# Patient Record
Sex: FEMALE | Race: WHITE | NOT HISPANIC OR LATINO | Employment: UNEMPLOYED | URBAN - METROPOLITAN AREA
[De-identification: names, ages, dates, MRNs, and addresses within clinical notes are randomized per-mention and may not be internally consistent; named-entity substitution may affect disease eponyms.]

---

## 2024-07-12 ENCOUNTER — PREP FOR PROCEDURE (OUTPATIENT)
Dept: OBSTETRICS AND GYNECOLOGY | Facility: HOSPITAL | Age: 39
End: 2024-07-12

## 2024-07-12 ENCOUNTER — LAB (OUTPATIENT)
Dept: LAB | Facility: LAB | Age: 39
End: 2024-07-12
Payer: COMMERCIAL

## 2024-07-12 ENCOUNTER — OFFICE VISIT (OUTPATIENT)
Dept: OBSTETRICS AND GYNECOLOGY | Facility: CLINIC | Age: 39
End: 2024-07-12
Payer: COMMERCIAL

## 2024-07-12 VITALS
HEIGHT: 70 IN | SYSTOLIC BLOOD PRESSURE: 112 MMHG | BODY MASS INDEX: 40.8 KG/M2 | DIASTOLIC BLOOD PRESSURE: 72 MMHG | WEIGHT: 285 LBS

## 2024-07-12 DIAGNOSIS — N93.9 ABNORMAL UTERINE BLEEDING (AUB): ICD-10-CM

## 2024-07-12 DIAGNOSIS — N93.9 ABNORMAL UTERINE BLEEDING (AUB): Primary | ICD-10-CM

## 2024-07-12 DIAGNOSIS — Z12.4 CERVICAL CANCER SCREENING: ICD-10-CM

## 2024-07-12 PROBLEM — L68.0 HIRSUTISM: Status: RESOLVED | Noted: 2024-07-12 | Resolved: 2024-07-12

## 2024-07-12 PROBLEM — N63.0 MASS OF AXILLARY TAIL OF BREAST: Status: RESOLVED | Noted: 2024-07-12 | Resolved: 2024-07-12

## 2024-07-12 PROBLEM — H50.812 DUANE'S SYNDROME OF LEFT EYE: Status: ACTIVE | Noted: 2018-07-12

## 2024-07-12 PROBLEM — N94.6 DYSMENORRHEA: Status: ACTIVE | Noted: 2024-07-12

## 2024-07-12 LAB
ERYTHROCYTE [DISTWIDTH] IN BLOOD BY AUTOMATED COUNT: 12.3 % (ref 11.5–14.5)
HCT VFR BLD AUTO: 40.6 % (ref 36–46)
HGB BLD-MCNC: 14.3 G/DL (ref 12–16)
MCH RBC QN AUTO: 31.4 PG (ref 26–34)
MCHC RBC AUTO-ENTMCNC: 35.2 G/DL (ref 32–36)
MCV RBC AUTO: 89 FL (ref 80–100)
NRBC BLD-RTO: 0 /100 WBCS (ref 0–0)
PLATELET # BLD AUTO: 270 X10*3/UL (ref 150–450)
RBC # BLD AUTO: 4.55 X10*6/UL (ref 4–5.2)
TSH SERPL-ACNC: 0.54 MIU/L (ref 0.44–3.98)
WBC # BLD AUTO: 7.9 X10*3/UL (ref 4.4–11.3)

## 2024-07-12 PROCEDURE — 1036F TOBACCO NON-USER: CPT | Performed by: OBSTETRICS & GYNECOLOGY

## 2024-07-12 PROCEDURE — 85027 COMPLETE CBC AUTOMATED: CPT

## 2024-07-12 PROCEDURE — 84443 ASSAY THYROID STIM HORMONE: CPT

## 2024-07-12 PROCEDURE — 36415 COLL VENOUS BLD VENIPUNCTURE: CPT

## 2024-07-12 PROCEDURE — 99214 OFFICE O/P EST MOD 30 MIN: CPT | Performed by: OBSTETRICS & GYNECOLOGY

## 2024-07-12 PROCEDURE — 87624 HPV HI-RISK TYP POOLED RSLT: CPT

## 2024-07-12 RX ORDER — GABAPENTIN 600 MG/1
600 TABLET ORAL ONCE
OUTPATIENT
Start: 2024-07-12 | End: 2024-07-12

## 2024-07-12 RX ORDER — CELECOXIB 50 MG/1
400 CAPSULE ORAL ONCE
OUTPATIENT
Start: 2024-07-12 | End: 2024-07-12

## 2024-07-12 RX ORDER — ACETAMINOPHEN 325 MG/1
975 TABLET ORAL ONCE
OUTPATIENT
Start: 2024-07-12 | End: 2024-07-12

## 2024-07-12 ASSESSMENT — ENCOUNTER SYMPTOMS
PSYCHIATRIC NEGATIVE: 1
CONSTITUTIONAL NEGATIVE: 1
CARDIOVASCULAR NEGATIVE: 1
GASTROINTESTINAL NEGATIVE: 1
MUSCULOSKELETAL NEGATIVE: 1
RESPIRATORY NEGATIVE: 1
NEUROLOGICAL NEGATIVE: 1

## 2024-07-12 NOTE — PATIENT INSTRUCTIONS
Gynecologic Visit for Abnormal Uterine Bleeding:  You were seen in the office today for abnormal uterine bleeding  Abnormal uterine bleeding can include menstrual bleeding that is excessively heavy, painful, prolonged, bleeding between periods, bleeding after/during intercourse, or a combination of these  Causes can include hormonal changes affecting ovulation, unstable and/or thickened endometrial lining, structural abnormalities of the uterus like polyps or fibroids, adenomyosis or endometriosis, and cervical/vaginal abnormalities  You have been given orders for labs and ultrasound imaging. Please do these as soon as possible as the results will help guide necessity of further workup, such as endometrial sampling, and management options. You will receive results by phone/MyChart  Treatment options may include expectant/symptomatic treatment, hormonal suppression and support of the endometrium, surgical thinning of the endometrium with D&C and/or removal of endometrial abnormalities, uterine artery embolization, and hysterectomy  If you have any questions or concerns prior to discussion of lab and imaging results, please call the office. [phone] ([place]) or [phone] ([place])

## 2024-07-12 NOTE — PROGRESS NOTES
"Subjective   Patient ID: Geeta Ramos is a 38 y.o. female who presents for Vaginal Bleeding.  Vaginal Bleeding        Patient presents for AUB. She has a longstanding history of heavy and painful periods. She is s/p ablation in 2020. She was never amenorrheic following her ablation but initially had improvement in symptoms. Over the last year her periods have become heavier and more painful. She states most recently she had a prolonged period that lasted 2 weeks. Prior to that she has had irregular periods between 14-28 days, often with prolonged and heavy bleeding. Over last year she has additionally had onset and worsening of dyspareunia.     Review of Systems   Constitutional: Negative.    Respiratory: Negative.     Cardiovascular: Negative.    Gastrointestinal: Negative.    Genitourinary:  Positive for vaginal bleeding.   Musculoskeletal: Negative.    Skin: Negative.    Neurological: Negative.    Psychiatric/Behavioral: Negative.         Objective   Visit Vitals  /72   Ht 1.778 m (5' 10\")   Wt 129 kg (285 lb)   BMI 40.89 kg/m²   OB Status Ablation   Smoking Status Former   BSA 2.52 m²       Physical Exam  Constitutional:       Appearance: Normal appearance.   Pulmonary:      Effort: Pulmonary effort is normal.   Genitourinary:     Comments: Vulva normal in appearance without discoloration, rashes, lesions. Vagina is negative for blood, discharge, lesions. Uterus is small, mobile, non tender. There is no cervical motion tenderness, adnexal masses/tenderness.    Neurological:      Mental Status: She is alert.   Psychiatric:         Mood and Affect: Mood normal.         Behavior: Behavior normal.         Assessment/Plan   Problem List Items Addressed This Visit    None  Visit Diagnoses         Codes    Abnormal uterine bleeding (AUB)    -  Primary N93.9    Relevant Orders    TSH with reflex to Free T4 if abnormal    CBC    US PELVIS TRANSABDOMINAL WITH TRANSVAGINAL          Discussed findings on exam and " clinical course  Previously evaluated but no recent labs/imaging  Orders given for TSH, CBC, and US  Endometrial sampling is appropriate given worsening AUB with history ablation and risk factors (PCOS)  Discussed management options including progesterone suppression, UAE, and hysterectomy  Will call with all results, patient to schedule apt for EMB  Precautions given       Marilyn Reese,  07/12/24 3:28 PM

## 2024-07-15 PROBLEM — N93.9 ABNORMAL UTERINE BLEEDING (AUB): Status: ACTIVE | Noted: 2024-07-12

## 2024-07-16 ENCOUNTER — HOSPITAL ENCOUNTER (OUTPATIENT)
Dept: RADIOLOGY | Facility: CLINIC | Age: 39
Discharge: HOME | End: 2024-07-16
Payer: COMMERCIAL

## 2024-07-16 DIAGNOSIS — N93.9 ABNORMAL UTERINE BLEEDING (AUB): ICD-10-CM

## 2024-07-16 PROCEDURE — 76830 TRANSVAGINAL US NON-OB: CPT | Performed by: RADIOLOGY

## 2024-07-16 PROCEDURE — 76856 US EXAM PELVIC COMPLETE: CPT | Performed by: RADIOLOGY

## 2024-07-16 PROCEDURE — 76856 US EXAM PELVIC COMPLETE: CPT

## 2024-07-19 LAB
CYTOLOGY CMNT CVX/VAG CYTO-IMP: NORMAL
HPV HR 12 DNA GENITAL QL NAA+PROBE: NEGATIVE
HPV HR GENOTYPES PNL CVX NAA+PROBE: NEGATIVE
HPV16 DNA SPEC QL NAA+PROBE: NEGATIVE
HPV18 DNA SPEC QL NAA+PROBE: NEGATIVE
LAB AP CONTRACEPTIVE HISTORY: NORMAL
LAB AP HPV GENOTYPE QUESTION: YES
LAB AP HPV HR: NORMAL
LABORATORY COMMENT REPORT: NORMAL
PATH REPORT.TOTAL CANCER: NORMAL

## 2024-07-22 ENCOUNTER — APPOINTMENT (OUTPATIENT)
Dept: OBSTETRICS AND GYNECOLOGY | Facility: CLINIC | Age: 39
End: 2024-07-22
Payer: COMMERCIAL

## 2024-07-22 VITALS — SYSTOLIC BLOOD PRESSURE: 120 MMHG | BODY MASS INDEX: 40.89 KG/M2 | WEIGHT: 285 LBS | DIASTOLIC BLOOD PRESSURE: 80 MMHG

## 2024-07-22 DIAGNOSIS — Z32.02 PREGNANCY TEST NEGATIVE: ICD-10-CM

## 2024-07-22 DIAGNOSIS — N93.9 ABNORMAL UTERINE BLEEDING (AUB): Primary | ICD-10-CM

## 2024-07-22 LAB — PREGNANCY TEST URINE, POC: NEGATIVE

## 2024-07-22 PROCEDURE — 81025 URINE PREGNANCY TEST: CPT | Performed by: OBSTETRICS & GYNECOLOGY

## 2024-07-22 PROCEDURE — 88305 TISSUE EXAM BY PATHOLOGIST: CPT

## 2024-07-22 PROCEDURE — 88305 TISSUE EXAM BY PATHOLOGIST: CPT | Performed by: PATHOLOGY

## 2024-07-22 PROCEDURE — 58100 BIOPSY OF UTERUS LINING: CPT | Performed by: OBSTETRICS & GYNECOLOGY

## 2024-07-22 NOTE — PATIENT INSTRUCTIONS
Endometrial Biopsy Instructions:  You were seen in the office today for abnormal uterine bleeding and had an endometrial biopsy done  A nerve block was done at the time of the biopsy to help with pain management but you may have some cramping today. You may take Tylenol or Ibuprofen for any cramping/discomfort  You will likely have some bleeding today that should be light and taper off/stop within a few days  You will receive a call/SocialStayhart message when your results are back  Please call the office for any excessively heavy bleeding (soaking through more than one pad per hour), pain that is not controlled on Ibuprofen/Tylenol, foul smelling vaginal discharge, vaginal itching, vulvar rashes, vaginal odor, pain with urination. [phone] ([place]) or [phone] ([place])

## 2024-07-22 NOTE — PROGRESS NOTES
Patient ID: Geeta Ramos is a 38 y.o. female.    Biopsy endometrium    Date/Time: 7/22/2024 2:45 PM    Performed by: Marilyn Reese DO  Authorized by: Marilyn Reese DO    Consent:     Consent obtained: written    Consent given by: patient    Risks discussed: bleeding, infection and need for repeat procedure    Alternatives discussed: observation, referral and alternative treatment    Patient agrees, verbalizes understanding, and wants to proceed: yes      Procedure explained and questions answered to patient or proxy's satisfaction: yes    Indications:     Indications: abnormal uterine bleeding    Pre-procedure:     Urine pregnancy test: negative      Premeds: NSAID    Procedure:     A bimanual exam was performed: no      Prepped with: Betadine    Tenaculum used: yes      A local block was performed: yes      Block method: paracervical block      Local anesthetic: lidocaine 1% w/o epi    Amount used (mL): 3    Cervix dilated: no      Number of passes: 2  Findings:     Cervix: normal      Uterus depth by sound (cm): 11    Specimen collected: specimen collected and sent to pathology      Patient tolerance: tolerated well, no immediate complications  Marilyn Reese DO

## 2024-07-29 LAB
LABORATORY COMMENT REPORT: NORMAL
PATH REPORT.FINAL DX SPEC: NORMAL
PATH REPORT.GROSS SPEC: NORMAL
PATH REPORT.RELEVANT HX SPEC: NORMAL
PATH REPORT.TOTAL CANCER: NORMAL

## 2024-09-05 ENCOUNTER — PRE-ADMISSION TESTING (OUTPATIENT)
Dept: PREADMISSION TESTING | Facility: HOSPITAL | Age: 39
End: 2024-09-05
Payer: COMMERCIAL

## 2024-09-05 VITALS
OXYGEN SATURATION: 99 % | TEMPERATURE: 97.2 F | RESPIRATION RATE: 18 BRPM | BODY MASS INDEX: 41.03 KG/M2 | DIASTOLIC BLOOD PRESSURE: 81 MMHG | WEIGHT: 286.6 LBS | SYSTOLIC BLOOD PRESSURE: 137 MMHG | HEIGHT: 70 IN | HEART RATE: 76 BPM

## 2024-09-05 DIAGNOSIS — N93.9 ABNORMAL UTERINE BLEEDING (AUB): ICD-10-CM

## 2024-09-05 DIAGNOSIS — Z01.818 PREOPERATIVE EXAMINATION: Primary | ICD-10-CM

## 2024-09-05 LAB
ABO GROUP (TYPE) IN BLOOD: NORMAL
ANTIBODY SCREEN: NORMAL
APPEARANCE UR: CLEAR
BILIRUB UR STRIP.AUTO-MCNC: NEGATIVE MG/DL
COLOR UR: COLORLESS
ERYTHROCYTE [DISTWIDTH] IN BLOOD BY AUTOMATED COUNT: 11.9 % (ref 11.5–14.5)
GLUCOSE UR STRIP.AUTO-MCNC: NORMAL MG/DL
HCT VFR BLD AUTO: 42.1 % (ref 36–46)
HGB BLD-MCNC: 14.4 G/DL (ref 12–16)
HOLD SPECIMEN: NORMAL
KETONES UR STRIP.AUTO-MCNC: NEGATIVE MG/DL
LEUKOCYTE ESTERASE UR QL STRIP.AUTO: NEGATIVE
MCH RBC QN AUTO: 30.2 PG (ref 26–34)
MCHC RBC AUTO-ENTMCNC: 34.2 G/DL (ref 32–36)
MCV RBC AUTO: 88 FL (ref 80–100)
NITRITE UR QL STRIP.AUTO: NEGATIVE
NRBC BLD-RTO: 0 /100 WBCS (ref 0–0)
PH UR STRIP.AUTO: 7 [PH]
PLATELET # BLD AUTO: 228 X10*3/UL (ref 150–450)
PROT UR STRIP.AUTO-MCNC: NEGATIVE MG/DL
RBC # BLD AUTO: 4.77 X10*6/UL (ref 4–5.2)
RBC # UR STRIP.AUTO: NEGATIVE /UL
RH FACTOR (ANTIGEN D): NORMAL
SP GR UR STRIP.AUTO: 1
UROBILINOGEN UR STRIP.AUTO-MCNC: NORMAL MG/DL
WBC # BLD AUTO: 5.8 X10*3/UL (ref 4.4–11.3)

## 2024-09-05 PROCEDURE — 86901 BLOOD TYPING SEROLOGIC RH(D): CPT

## 2024-09-05 PROCEDURE — 85027 COMPLETE CBC AUTOMATED: CPT

## 2024-09-05 PROCEDURE — 36415 COLL VENOUS BLD VENIPUNCTURE: CPT

## 2024-09-05 PROCEDURE — 99204 OFFICE O/P NEW MOD 45 MIN: CPT | Performed by: NURSE PRACTITIONER

## 2024-09-05 PROCEDURE — 81003 URINALYSIS AUTO W/O SCOPE: CPT

## 2024-09-05 RX ORDER — CHLORHEXIDINE GLUCONATE 40 MG/ML
1 SOLUTION TOPICAL DAILY
Start: 2024-09-05 | End: 2024-09-10

## 2024-09-05 ASSESSMENT — DUKE ACTIVITY SCORE INDEX (DASI)
TOTAL_SCORE: 58.2
CAN YOU WALK A BLOCK OR TWO ON LEVEL GROUND: YES
CAN YOU CLIMB A FLIGHT OF STAIRS OR WALK UP A HILL: YES
CAN YOU DO YARD WORK LIKE RAKING LEAVES, WEEDING OR PUSHING A MOWER: YES
CAN YOU DO HEAVY WORK AROUND THE HOUSE LIKE SCRUBBING FLOORS OR LIFTING AND MOVING HEAVY FURNITURE: YES
CAN YOU HAVE SEXUAL RELATIONS: YES
CAN YOU WALK INDOORS, SUCH AS AROUND YOUR HOUSE: YES
CAN YOU DO LIGHT WORK AROUND THE HOUSE LIKE DUSTING OR WASHING DISHES: YES
DASI METS SCORE: 9.9
CAN YOU PARTICIPATE IN STRENOUS SPORTS LIKE SWIMMING, SINGLES TENNIS, FOOTBALL, BASKETBALL, OR SKIING: YES
CAN YOU RUN A SHORT DISTANCE: YES
CAN YOU TAKE CARE OF YOURSELF (EAT, DRESS, BATHE, OR USE TOILET): YES
CAN YOU PARTICIPATE IN MODERATE RECREATIONAL ACTIVITIES LIKE GOLF, BOWLING, DANCING, DOUBLES TENNIS OR THROWING A BASEBALL OR FOOTBALL: YES
CAN YOU DO MODERATE WORK AROUND THE HOUSE LIKE VACUUMING, SWEEPING FLOORS OR CARRYING GROCERIES: YES

## 2024-09-05 ASSESSMENT — ENCOUNTER SYMPTOMS
EYES NEGATIVE: 1
RESPIRATORY NEGATIVE: 1
MUSCULOSKELETAL NEGATIVE: 1
CONSTITUTIONAL NEGATIVE: 1
NEUROLOGICAL NEGATIVE: 1
NECK NEGATIVE: 1
CARDIOVASCULAR NEGATIVE: 1
GASTROINTESTINAL NEGATIVE: 1

## 2024-09-05 ASSESSMENT — LIFESTYLE VARIABLES: SMOKING_STATUS: NONSMOKER

## 2024-09-05 ASSESSMENT — PAIN - FUNCTIONAL ASSESSMENT: PAIN_FUNCTIONAL_ASSESSMENT: 0-10

## 2024-09-05 ASSESSMENT — PAIN SCALES - GENERAL: PAINLEVEL_OUTOF10: 0 - NO PAIN

## 2024-09-05 NOTE — PREPROCEDURE INSTRUCTIONS
Thank you for visiting [facility] today for your pre-procedure evaluation, you were seen by     Silva Zepeda CNP  Pre Admission [facility]  [facility]  [phone]    This summary includes instructions and information to aid you during your perioperative period.  Please read carefully. If you have any questions about your visit today, please call the number listed above.  If you become ill or have any changes to your health before your surgery, please contact your primary care provider and alert your surgeon.          Preparing for your Surgery       Exercises  Preoperative Deep Breathing Exercises  Why it is important to do deep breathing exercises before my surgery?  Deep breathing exercises strengthen your breathing muscles.  This helps you to recover after your surgery and decreases the chance of breathing complications.  How are the deep breathing exercises done?  Sit straight with your back supported.  Breathe in deeply and slowly through your nose. Your lower rib cage should expand and your abdomen may move forward.  Hold that breath for 3 to 5 seconds.  Breathe out through pursed lips, slowly and completely.  Rest and repeat 10 times every hour while awake.  Rest longer if you become dizzy or lightheaded.       Preoperative Brain Exercises    What are brain exercises?  A brain exercise is any activity that engages your thinking (cognitive) skills.    What types of activities are considered brain exercises?  Jigsaw puzzles, crossword puzzles, word jumble, memory games, word search, and many more.  Many can be found free online or on your phone via a mobile alexandra.    Why should I do brain exercises before my surgery?  More recent research has shown brain exercise before surgery can lower the risk of postoperative delirium (confusion) which can be especially important for older adults.  Patients who did brain exercises for 5 to 10 hours the days before surgery, cut their  risk of postoperative delirium in half up to 1 week after surgery.    Sit-to-Stand Exercise    What is the sit-to-stand exercise?  The sit-to-stand exercise strengthens the muscles of your lower body and muscles in the center of your body (core muscles for stability) helping to maintain and improve your strength and mobility.  How do I do the sit-to-stand exercise?  The goal is to do this exercise without using your arms or hands.  If this is too difficult, use your arms and hands or a chair with armrests to help slowly push yourself to the standing position and lower yourself back to the sitting position. As the movement becomes easier use your arms and hands less.    Steps to the sit-to-stand exercise  Sit up tall in a sturdy chair, knees bent, feet flat on the floor shoulder-width apart.  Shift your hips/pelvis forward in the chair to correctly position yourself for the next movement.  Lean forward at your hips.  Stand up straight putting equal weight on both feet.  Check to be sure you are properly aligned with the chair, in a slow controlled movement sit back down.  Repeat this exercise 10-15 times.  If needed you can do it fewer times until your strength improves.  Rest for 1 minute.  Do another 10-15 sit-to-stand exercises.  Try to do this in the morning and evening.        Instructions    Preoperative Fasting Guidelines    Why must I stop eating and drinking near surgery time?  With sedation, food or liquid in your stomach can enter your lungs causing serious complications  Food can increase nausea and vomiting  When do I need to stop eating and drinking before my surgery?      Do not eat any food or drink any liquids after midnight the night before your surgery/procedure.  You may have sips of water to take medications.       Simple things you can do to help prevent blood clots     Blood clots are blockages that can form in the body's veins. When a blood clot forms in your deep veins, it may be called a  deep vein thrombosis, or DVT for short. Blood clots can happen in any part of the body where blood flows, but they are most common in the arms and legs. If a piece of a blood clot breaks free and travels to the lungs, it is called a pulmonary embolus (PE). A PE can be a very serious problem.         Being in the hospital or having surgery can raise your chances of getting a blood clot because you may not be well enough to move around as much as you normally do.         Ways you can help prevent blood clots in the hospital       Wearing SCDs  SCDs stands for Sequential Compression Devices.   SCDs are special sleeves that wrap around your legs. They attach to a pump that fills them with air to gently squeeze your legs every few minutes.  This helps return the blood in your legs to your heart.   SCDs should only be taken off when walking or bathing. SCDs may not be comfortable, but they can help save your life.              Pump SCD leg sleeves  Wearing compression stockings - if your doctor orders them. These special snug-fitting stockings gently squeeze your legs to help blood flow.       Walking. Walking helps move the blood in your legs.   If your doctor says it is ok, try walking the halls at least   5 times a day. Ask us to help you get up, so you don't fall.      Taking any blood-thinning medicines your doctor orders.              Ways you can help prevent blood clots at home         Wearing compression stockings - if your doctor orders them.   Walking - to help move the blood in your legs.    Taking any blood-thinning medicines your doctor orders.      Signs of a blood clot or PE    Tell your doctor or nurse right away if you have any of the problems listed below.         If you are at home, seek medical care right away. Call 911 for chest pain or problems breathing.            Signs of a blood clot (DVT) - such as pain, swelling, redness, or warmth in your arm or legs.  Signs of a pulmonary embolism (PE) -  "such as chest pain or feeling short of breath      Tobacco and Alcohol;  Do not drink alcohol or smoke within 24 hours of surgery.  It is best to quit smoking for as long as possible before any surgery or procedure.    Other Instructions  Why did I have my nose, under my arms, and groin swabbed? The purpose of the swab is to identify Staphylococcus aureus inside your nose or on your skin.  The swab was sent to the laboratory for culture.  A positive swab/culture for Staphylococcus aureus is called colonization or carriage.     What is Staphylococcus aureus? Staphylococcus aureus, also known as \"staph\", is a germ found on the skin or in the nose of healthy people.  Sometimes Staphylococcus aureus can get into the body and cause an infection.  This can be minor (such as pimples, boils, or other skin problems).  It might also be serious (such as a blood infection, pneumonia, or a surgical site infection).     What is Staphylococcus aureus colonization or carriage? Colonization or carriage means that a person has the germ but is not sick from it.  These bacteria can be spread on the hands or when breathing or sneezing.   How is Staphylococcus aureus spread? It is most often spread by close contact with a person or item that carries it.   What happens if my culture is positive for Staphylococcus aureus? Your doctor/medical team will use this information to guide any antibiotic treatment which may be necessary.  Regardless of the culture results, we will clean the inside of your nose with a betadine swab just before you have your surgery.   Will I get an infection if I have Staphylococcus aureus in my nose or on my skin? Anyone can get an infection with Staphylococcus aureus.  However, the best way to reduce your risk of infection is to follow the instructions provided to you for the use of your CHG soap and dental rinse.      Body Wash:     What is a home preoperative antibacterial shower? This shower is a way of " cleaning the skin with a germ-killing solution before surgery.  The solution contains chlorhexidine, commonly known as CHG.  CHG is a skin cleanser with germ-killing ability.  Let your doctor know if you are allergic to chlorhexidine.   Why do I need to take a preoperative antibacterial shower? Skin is not sterile.  It is best to try to make your skin as free of germs as possible before surgery.  Proper cleansing with a germ-killing soap before surgery can lower the number of germs on your skin.  This helps to reduce the risk of infection at the surgical site.    Following the instructions listed below will help you prepare your skin for surgery.   How do I use the solution? Steps:  Begin using your CHG soap 5 days before your scheduled surgery on ________9/11/2024___.     Keep CHG soap away from ear canals and eyes.  Rinse completely, do not condition.  Hair extensions should be removed. ,            The Week before Surgery        Seven days before Surgery  Check your PAT medication instructions  Do the exercises provided to you by [facility]  Arrange for a responsible, adult licensed  to take you home after surgery and stay with you for 24 hours.  You will not be permitted to drive yourself home if you have received any anesthetic/sedation  Six days before surgery  Check your PAT medication instructions  Do the exercises provided to you by PAT  Start using Chlorhexidene (CHG) body wash if prescribed  Five days before surgery  Check your PAT medication instructions  Do the exercises provided to you by PAT  Continue to use CHG body wash if prescribed  Three days before surgery  Check your PAT medication instructions  Do the exercises provided to you by PAT  Continue to use CHG body wash if prescribed  Two days before surgery  Check your PAT medication instructions  Do the exercises provided to you by [facility]  Continue to use CHG body wash if prescribed    The Day before Surgery       Check your PAT medication and all  other PAT instructions including when to stop eating and drinking  You will be called with your arrival time for surgery in the late afternoon.  If you do not receive a call please reach out to [facility]. [phone]  Do not smoke or drink 24 hours before surgery  Prepare items to bring with you to the hospital  Shower with your chlorhexidine wash if prescribed  Brush your teeth and use your chlorhexidine dental rinse if prescribed    The Day of Surgery       Check your PAT medication instructions  Ensure you follow the instructions for when to stop eating and drinking  Shower, if prescribed use CHG.  Do not apply any lotions, creams, moisturizers, perfume or deodorant  Brush your teeth and use your CHG dental rinse if prescribed  Wear loose comfortable clothing  Avoid make-up  Remove  jewelry and piercings, consider professional piercing removal with a plastic spacer if needed  Bring photo ID and Insurance card  Bring an accurate medication list that includes medication dose, frequency and allergies  Bring a copy of your advanced directives (will, health care power of )  Bring any devices and controllers as well as medical devices you have been provided with for surgery (CPAP, slings, braces, etc.)  Dentures, eyeglasses, and contacts will be removed before surgery, please bring cases for contacts or glasses

## 2024-09-05 NOTE — CPM/PAT H&P
CPM/PAT Evaluation       Name: Geeta Ramos (Geeta Ramos)  /Age: 1985/39 y.o.     Visit Type:   In-Person       Chief Complaint: Abnormal uterine bleeding    HPI 38 y/o female scheduled for hysterectomy salpingectomy laparoscopic on 2024 with  Janey Reese and José Luis secondary to Abnormal uterine bleeding.  PMHX includes abnormal uterine bleeding.  PAT is consulted today for perioperative risk stratification and optimization.      Past Medical History:   Diagnosis Date    Encounter for full-term uncomplicated delivery ([facility])      (spontaneous vaginal delivery)    Endometriosis 2014    Gastro-esophageal reflux disease without esophagitis     Gastroesophageal reflux disease, esophagitis presence not specified    Hirsutism 2024    Hypothyroidism, unspecified     Hypothyroidism, unspecified type    Mass of axillary tail of breast 2024    Mild to moderate pre-eclampsia, complicating childbirth ([facility])     Mild preeclampsia delivered    Personal history of other complications of pregnancy, childbirth and the puerperium 2018    History of pregnancy induced hypertension    Personal history of other complications of pregnancy, childbirth and the puerperium     History of spontaneous     Personal history of other endocrine, nutritional and metabolic disease     History of thyroid nodule    Personal history of other endocrine, nutritional and metabolic disease 2017    History of thyroid disorder    PONV (postoperative nausea and vomiting)     Postpartum depression     Postpartum depression       Past Surgical History:   Procedure Laterality Date    CHOLECYSTECTOMY  2016    Cholecystectomy    COLONOSCOPY  2016    Colonoscopy (Fiberoptic)    DILATION AND CURETTAGE OF UTERUS  2016    Dilation And Curettage    ENDOMETRIAL ABLATION      LAPAROSCOPY DIAGNOSTIC / BIOPSY / ASPIRATION / LYSIS  2016    Exploratory Laparoscopy    SINUS SURGERY   07/19/2016    Sinus Surgery       Patient  reports being sexually active and has had partner(s) who are male. She reports using the following method of birth control/protection: Male Sterilization.    Family History   Problem Relation Name Age of Onset    Hypertension Mother      Hyperlipidemia Mother      Stroke Mother      Depression Mother      Anxiety disorder Mother      Hypertension Father      Hyperlipidemia Father      Heart disease Father      Bipolar disorder Father      Breast cancer Maternal Grandmother      Hypertension Maternal Grandmother      Hyperlipidemia Maternal Grandmother      Pancreatic cancer Maternal Grandfather      Lung cancer Maternal Grandfather      Diabetes type II Maternal Grandfather      Hypertension Maternal Grandfather      Hyperlipidemia Maternal Grandfather      Hypertension Paternal Grandmother      Hyperlipidemia Paternal Grandmother      Colon cancer Paternal Grandfather      Hypertension Paternal Grandfather      Hyperlipidemia Paternal Grandfather         Allergies   Allergen Reactions    Codeine Itching, Nausea Only, Other, Shortness of breath and Nausea/vomiting    Bee Venom Protein (Honey Bee) Swelling       Prior to Admission medications    Medication Sig Start Date End Date Taking? Authorizing Provider   chlorhexidine (Hibiclens) 4 % external liquid Apply 1 Application topically once daily for 5 days. Use per CPM/PAT provided instructions 9/5/24 9/10/24  Silva Zepeda, APRN-CNP        PAT ROS:   Constitutional:   neg    Neuro/Psych:   neg    Eyes:    Duane syndrome - left eye  neg    Ears:   neg    Nose:   Mouth:   Throat:   neg    Neck:   neg    Cardio:   neg    Respiratory:   neg    Endocrine:   GI:   neg    :   neg    Musculoskeletal:   neg    Hematologic:   neg    Skin:      Physical Exam  Constitutional:       Appearance: Normal appearance.   HENT:      Head: Normocephalic and atraumatic.      Mouth/Throat:      Mouth: Mucous membranes are moist.       Pharynx: Oropharynx is clear.   Eyes:      Extraocular Movements: Extraocular movements intact.      Pupils: Pupils are equal, round, and reactive to light.   Cardiovascular:      Rate and Rhythm: Normal rate and regular rhythm.   Pulmonary:      Effort: Pulmonary effort is normal.      Breath sounds: Normal breath sounds.   Abdominal:      General: Abdomen is flat.      Palpations: Abdomen is soft.   Musculoskeletal:         General: Normal range of motion.      Cervical back: Normal range of motion and neck supple.   Skin:     General: Skin is warm and dry.   Neurological:      General: No focal deficit present.      Mental Status: She is alert and oriented to person, place, and time.   Psychiatric:         Mood and Affect: Mood normal.         Behavior: Behavior normal.          PAT AIRWAY:   Airway:     Mallampati::  II    Neck ROM::  Full  normal        Visit Vitals  /81   Pulse 76   Temp 36.2 °C (97.2 °F) (Temporal)   Resp 18       DASI Risk Score      Flowsheet Row Most Recent Value   DASI SCORE 58.2   METS Score (Will be calculated only when all the questions are answered) 9.9          Caprini DVT Assessment      Flowsheet Row Most Recent Value   DVT Score 5   Current Status Major surgery planned, including arthroscopic and laproscopic (1-2 hours)   Age Less than 40 years   BMI 41-50 (Morbid obesity)          Modified Frailty Index    No data to display       CHADS2 Stroke Risk  Current as of 10 minutes ago        N/A 3 to 100%: High Risk   2 to < 3%: Medium Risk   0 to < 2%: Low Risk     Last Change: N/A          This score determines the patient's risk of having a stroke if the patient has atrial fibrillation.        This score is not applicable to this patient. Components are not calculated.          Revised Cardiac Risk Index      Flowsheet Row Most Recent Value   Revised Cardiac Risk Calculator 0          Apfel Simplified Score      Flowsheet Row Most Recent Value   Apfel Simplified Score  Calculator 3          Risk Analysis Index Results This Encounter    No data found in the last 1 encounters.       Stop Bang Score      Flowsheet Row Most Recent Value   Do you snore loudly? 0   Do you often feel tired or fatigued after your sleep? 0   Has anyone ever observed you stop breathing in your sleep? 0   Do you have or are you being treated for high blood pressure? 0   Recent BMI (Calculated) 41.1   Is BMI greater than 35 kg/m2? 1=Yes   Age older than 50 years old? 0=No   Is your neck circumference greater than 17 inches (Male) or 16 inches (Female)? 0   Gender - Male 0=No   STOP-BANG Total Score 1            Assessment and Plan:     HPI 40 y/o female scheduled for hysterectomy salpingectomy laparoscopic on 9/11/2024 with  Janey Reese and José Luis secondary to Abnormal uterine bleeding.  PMHX includes abnormal uterine bleeding.  PAT is consulted today for perioperative risk stratification and optimization.    Neuro:  No neurologic diagnosis or significant findings on chart review, clinical presentation and evaluation.  No grossly apparent neurologic perioperative risk.  Patient is not at increased risk for perioperative CVA      HEENT:  No HEENT diagnosis or significant findings on chart review or clinical presentation and evaluation. No further preoperative testing/intervention indicated at this time.    Cardiovascular:  No CV diagnosis or significant findings on chart review or clinical presentation and evaluation. No further preoperative testing or intervention is indicated at this time.  METS: 9.9  RCRI: 0 points, 3.9%  risk for postoperative MACE   KEIKO: 0.0% risk for 30 day postoperative MACE    Pulmonary:  No pulmonary diagnosis, however patient is at increased risk of perioperative complications secondary to  duration of surgery > 2 hours  Stop Bang score is 1 placing patient at low risk for ATRSHA  ARISCAT: <26 points, 1.6% risk of in-hospital postoperative pulmonary complication  PRODIGY: Low  risk for opioid induced respiratory depression      Renal:   No renal diagnosis, however patient is at increase risk for perioperative renal complications secondary to  BMI equal to or greater than 30      Endocrine:  No endocrine diagnosis or significant findings on chart review or clinical presentation and evaluation. No further testing or intervention is indicated at this time.    Hematologic:  No hematologic diagnosis, however patient is at an increased risk for DVT  Caprini Score 5, patient at High risk for perioperative DVT.  Patient provided with VTE education/handout.    Gastrointestinal:   No GI diagnosis or significant findings on chart review or clinical presentation and evaluation.   Apfel 3    OB/Gyn  Follows with Dr. Reese.  Last seen 7/12/2024 for abnormal uterine bleeding  History of ABL  Plan for hysterectomy, salpingectomy laparoscopic      Infectious disease:   No infectious diagnosis or significant findings on chart review or clinical presentation and evaluation.   Prescription provided for CHG body wash and dental rinse. CHG use instructions reviewed and provided to patient.      Musculoskeletal:   No diagnosis or significant findings on chart review or clinical presentation and evaluation.     Anesthesia/Airway:  PONV      Medication instructions and NPO guidelines reviewed with the patient.  All questions or concerns discussed and addressed.      Labs ordered

## 2024-09-06 ENCOUNTER — OFFICE VISIT (OUTPATIENT)
Dept: OBSTETRICS AND GYNECOLOGY | Facility: CLINIC | Age: 39
End: 2024-09-06
Payer: COMMERCIAL

## 2024-09-06 VITALS
SYSTOLIC BLOOD PRESSURE: 116 MMHG | BODY MASS INDEX: 40.66 KG/M2 | DIASTOLIC BLOOD PRESSURE: 72 MMHG | WEIGHT: 284 LBS | HEIGHT: 70 IN

## 2024-09-06 DIAGNOSIS — N93.9 ABNORMAL UTERINE BLEEDING (AUB): Primary | ICD-10-CM

## 2024-09-06 PROCEDURE — 3008F BODY MASS INDEX DOCD: CPT | Performed by: OBSTETRICS & GYNECOLOGY

## 2024-09-06 PROCEDURE — 1036F TOBACCO NON-USER: CPT | Performed by: OBSTETRICS & GYNECOLOGY

## 2024-09-06 PROCEDURE — 99214 OFFICE O/P EST MOD 30 MIN: CPT | Performed by: OBSTETRICS & GYNECOLOGY

## 2024-09-06 NOTE — H&P (VIEW-ONLY)
Pre- Surgical History and Physical    HPI:  Patient presents for pre-surgical visit. She is scheduled for total laparoscopic hysterectomy, bilateral salpingectomy, cystoscopy on 24 for the following diagnosis/diagnoses: AUB. There has been no recent change in her health. She denies recent pelvic pain, discharge, itching, burning, rash, dysuria, odor. She is sexually active and denies dyspareunia. She denies recent new sexual partners. She uses vasectomy for contraception and is not using hormonal suppression for management of her gynecologic symptoms. Endometrial sampling was indicated, benign secretory endometrium noted on pathology.       Obstetrical History   OB History    Para Term  AB Living   6 4 2 2 2 4   SAB IAB Ectopic Multiple Live Births   2 0 0 0 4      # Outcome Date GA Lbr Ward/2nd Weight Sex Type Anes PTL Lv   6 SAB            5 SAB            4             3             2 Term            1 Term                Past Medical History  Past Medical History:   Diagnosis Date    Abnormal uterine bleeding (AUB)     Encounter for full-term uncomplicated delivery ([facility])      (spontaneous vaginal delivery)    Endometriosis 2014    Gastro-esophageal reflux disease without esophagitis     Gastroesophageal reflux disease, esophagitis presence not specified    Hirsutism 2024    Hypothyroidism, unspecified     Hypothyroidism, unspecified type    Mass of axillary tail of breast 2024    Mild to moderate pre-eclampsia, complicating childbirth ([facility])     Mild preeclampsia delivered    Personal history of other complications of pregnancy, childbirth and the puerperium 2018    History of pregnancy induced hypertension    Personal history of other complications of pregnancy, childbirth and the puerperium     History of spontaneous     Personal history of other endocrine, nutritional and metabolic disease     History of thyroid nodule    Personal  history of other endocrine, nutritional and metabolic disease 08/01/2017    History of thyroid disorder    PONV (postoperative nausea and vomiting)     Postpartum depression     Postpartum depression        Past Surgical History   Past Surgical History:   Procedure Laterality Date    CHOLECYSTECTOMY  07/19/2016    Cholecystectomy    COLONOSCOPY  07/19/2016    Colonoscopy (Fiberoptic)    DILATION AND CURETTAGE OF UTERUS  07/19/2016    Dilation And Curettage    ENDOMETRIAL ABLATION      LAPAROSCOPY DIAGNOSTIC / BIOPSY / ASPIRATION / LYSIS  07/19/2016    Exploratory Laparoscopy    SINUS SURGERY  07/19/2016    Sinus Surgery       Social History  Social History     Tobacco Use    Smoking status: Former     Types: Cigarettes    Smokeless tobacco: Never   Substance Use Topics    Alcohol use: Yes     Comment: rare     Substance and Sexual Activity   Drug Use Never       Allergies  Codeine and Bee venom protein (honey bee)     Medications  (Not in a hospital admission)        Objective    Last Vitals  Temp Pulse Resp BP MAP O2 Sat         116/72         Physical Examination  GENERAL: Examination reveals a well developed, well nourished, gravid female in no acute distress. She is alert and cooperative.  LUNGS: clear to auscultation bilaterally  HEART: regular rate and rhythm, S1, S2 normal, no murmur, click, rub or gallop  ABDOMEN: soft, NT, ND  VAGINA: normal appearing vagina with normal color and discharge and no lesions noted  EXTREMITIES: no redness or tenderness in the calves or thighs, no edema  SKIN: normal coloration and turgor, no rashes  NEUROLOGICAL: alert, oriented, normal speech, no focal findings or movement disorder noted  PSYCHOLOGICAL: awake and alert; oriented to person, place, and time    Lab Review  Lab Results   Component Value Date    WBC 5.8 09/05/2024    HGB 14.4 09/05/2024    HCT 42.1 09/05/2024     09/05/2024       A/P:  Admit through [facility] center  Insert IV, IVF, NPO  CBC, T&S  Ancef  for antibiotic prophylaxis, to be given prior to incision  General anesthesia  SCD to be placed in pre-op    Marilyn Reese, DO

## 2024-09-06 NOTE — PATIENT INSTRUCTIONS
Gynecologic Preoperative Visit: Hysterectomy  Your were seen in office today to discuss surgical management of abnormal uterine bleeding with hysterectomy.  As we discussed, based on your exam and imaging findings, the plan is for a total laparoscopic hysterectomy (removal of uterus and cervix in full using small incisions) with bilateral salpingectomy (removal of both fallopian tubes) and cystoscopy (looking in the bladder using a camera to ensure no injury/defect).  We discussed removal of the ovaries and are still considering this option at this time. You may let me know when you have decided  We discussed the risk of hysterectomy include: bleeding, infection, necessity for transfusion, malfunction of equipment, adverse reaction to anesthesia, necessity for a larger incision, injury to pelvic/abdominal structures   I perform surgeries at [facility] on Wednesday mornings. Most women will go home the same day as their hysterectomy, but occasionally they may need to stay overnight for observation  You will need to be off work for several weeks to heal properly following your hysterectomy and may need to have FMLA or short term disability paperwork submitted. Please bring these to the office for us to review and sign prior to surgery.   Restrictions following hysterectomy include no heavy lifting (>10 lbs) for 2 weeks, pelvic rest (nothing in the vagina including intercourse) for 8-10 weeks, no full submersion in water (bathtubs, hot tubs, pools) until 6 week office visit/cleared by surgeon.   Surgical scheduling requests have been submitted to both our office surgery scheduler and the hospital. You will be contacted to schedule your surgery date. You will need to be seen by preadmission testing prior to surgery and that will be scheduled when your surgery is scheduled.   If you have any questions please call the office to speak to me or send me a T[place]led message. [phone] ([place]) or  [phone] ([place]).

## 2024-09-06 NOTE — H&P
Pre- Surgical History and Physical    HPI:  Patient presents for pre-surgical visit. She is scheduled for total laparoscopic hysterectomy, bilateral salpingectomy, cystoscopy on 24 for the following diagnosis/diagnoses: AUB. There has been no recent change in her health. She denies recent pelvic pain, discharge, itching, burning, rash, dysuria, odor. She is sexually active and denies dyspareunia. She denies recent new sexual partners. She uses vasectomy for contraception and is not using hormonal suppression for management of her gynecologic symptoms. Endometrial sampling was indicated, benign secretory endometrium noted on pathology.       Obstetrical History   OB History    Para Term  AB Living   6 4 2 2 2 4   SAB IAB Ectopic Multiple Live Births   2 0 0 0 4      # Outcome Date GA Lbr Ward/2nd Weight Sex Type Anes PTL Lv   6 SAB            5 SAB            4             3             2 Term            1 Term                Past Medical History  Past Medical History:   Diagnosis Date    Abnormal uterine bleeding (AUB)     Encounter for full-term uncomplicated delivery ([facility])      (spontaneous vaginal delivery)    Endometriosis 2014    Gastro-esophageal reflux disease without esophagitis     Gastroesophageal reflux disease, esophagitis presence not specified    Hirsutism 2024    Hypothyroidism, unspecified     Hypothyroidism, unspecified type    Mass of axillary tail of breast 2024    Mild to moderate pre-eclampsia, complicating childbirth ([facility])     Mild preeclampsia delivered    Personal history of other complications of pregnancy, childbirth and the puerperium 2018    History of pregnancy induced hypertension    Personal history of other complications of pregnancy, childbirth and the puerperium     History of spontaneous     Personal history of other endocrine, nutritional and metabolic disease     History of thyroid nodule    Personal  history of other endocrine, nutritional and metabolic disease 08/01/2017    History of thyroid disorder    PONV (postoperative nausea and vomiting)     Postpartum depression     Postpartum depression        Past Surgical History   Past Surgical History:   Procedure Laterality Date    CHOLECYSTECTOMY  07/19/2016    Cholecystectomy    COLONOSCOPY  07/19/2016    Colonoscopy (Fiberoptic)    DILATION AND CURETTAGE OF UTERUS  07/19/2016    Dilation And Curettage    ENDOMETRIAL ABLATION      LAPAROSCOPY DIAGNOSTIC / BIOPSY / ASPIRATION / LYSIS  07/19/2016    Exploratory Laparoscopy    SINUS SURGERY  07/19/2016    Sinus Surgery       Social History  Social History     Tobacco Use    Smoking status: Former     Types: Cigarettes    Smokeless tobacco: Never   Substance Use Topics    Alcohol use: Yes     Comment: rare     Substance and Sexual Activity   Drug Use Never       Allergies  Codeine and Bee venom protein (honey bee)     Medications  (Not in a hospital admission)        Objective    Last Vitals  Temp Pulse Resp BP MAP O2 Sat         116/72         Physical Examination  GENERAL: Examination reveals a well developed, well nourished, gravid female in no acute distress. She is alert and cooperative.  LUNGS: clear to auscultation bilaterally  HEART: regular rate and rhythm, S1, S2 normal, no murmur, click, rub or gallop  ABDOMEN: soft, NT, ND  VAGINA: normal appearing vagina with normal color and discharge and no lesions noted  EXTREMITIES: no redness or tenderness in the calves or thighs, no edema  SKIN: normal coloration and turgor, no rashes  NEUROLOGICAL: alert, oriented, normal speech, no focal findings or movement disorder noted  PSYCHOLOGICAL: awake and alert; oriented to person, place, and time    Lab Review  Lab Results   Component Value Date    WBC 5.8 09/05/2024    HGB 14.4 09/05/2024    HCT 42.1 09/05/2024     09/05/2024       A/P:  [place] [facility]  Insert IV, IVF, NPO  CBC, T&S  Ancef  for antibiotic prophylaxis, to be given prior to incision  General anesthesia  SCD to be placed in pre-op    Marilyn Reese, DO

## 2024-09-10 ENCOUNTER — ANESTHESIA EVENT (OUTPATIENT)
Dept: OPERATING ROOM | Facility: HOSPITAL | Age: 39
End: 2024-09-10
Payer: COMMERCIAL

## 2024-09-11 ENCOUNTER — HOSPITAL ENCOUNTER (OUTPATIENT)
Facility: HOSPITAL | Age: 39
Setting detail: OUTPATIENT SURGERY
Discharge: HOME | End: 2024-09-11
Attending: OBSTETRICS & GYNECOLOGY | Admitting: OBSTETRICS & GYNECOLOGY
Payer: COMMERCIAL

## 2024-09-11 ENCOUNTER — PHARMACY VISIT (OUTPATIENT)
Dept: PHARMACY | Facility: CLINIC | Age: 39
End: 2024-09-11
Payer: MEDICARE

## 2024-09-11 ENCOUNTER — ANESTHESIA (OUTPATIENT)
Dept: OPERATING ROOM | Facility: HOSPITAL | Age: 39
End: 2024-09-11
Payer: COMMERCIAL

## 2024-09-11 VITALS
TEMPERATURE: 96.8 F | WEIGHT: 284.39 LBS | SYSTOLIC BLOOD PRESSURE: 112 MMHG | BODY MASS INDEX: 40.71 KG/M2 | RESPIRATION RATE: 14 BRPM | HEART RATE: 87 BPM | HEIGHT: 70 IN | OXYGEN SATURATION: 98 % | DIASTOLIC BLOOD PRESSURE: 51 MMHG

## 2024-09-11 DIAGNOSIS — Z90.710 S/P HYSTERECTOMY: Primary | ICD-10-CM

## 2024-09-11 DIAGNOSIS — N93.9 ABNORMAL UTERINE BLEEDING (AUB): ICD-10-CM

## 2024-09-11 PROBLEM — T88.53XA AWARENESS UNDER ANESTHESIA: Status: ACTIVE | Noted: 2024-09-11

## 2024-09-11 PROBLEM — Z98.890 PONV (POSTOPERATIVE NAUSEA AND VOMITING): Status: ACTIVE | Noted: 2024-09-11

## 2024-09-11 PROBLEM — R11.2 PONV (POSTOPERATIVE NAUSEA AND VOMITING): Status: ACTIVE | Noted: 2024-09-11

## 2024-09-11 LAB
ABO GROUP (TYPE) IN BLOOD: NORMAL
ABO GROUP (TYPE) IN BLOOD: NORMAL
ANTIBODY SCREEN: NORMAL
PREGNANCY TEST URINE, POC: NEGATIVE
RH FACTOR (ANTIGEN D): NORMAL
RH FACTOR (ANTIGEN D): NORMAL

## 2024-09-11 PROCEDURE — 2500000001 HC RX 250 WO HCPCS SELF ADMINISTERED DRUGS (ALT 637 FOR MEDICARE OP): Performed by: OBSTETRICS & GYNECOLOGY

## 2024-09-11 PROCEDURE — RXMED WILLOW AMBULATORY MEDICATION CHARGE

## 2024-09-11 PROCEDURE — 2500000004 HC RX 250 GENERAL PHARMACY W/ HCPCS (ALT 636 FOR OP/ED): Performed by: OBSTETRICS & GYNECOLOGY

## 2024-09-11 PROCEDURE — 2500000004 HC RX 250 GENERAL PHARMACY W/ HCPCS (ALT 636 FOR OP/ED)

## 2024-09-11 PROCEDURE — 36415 COLL VENOUS BLD VENIPUNCTURE: CPT | Performed by: OBSTETRICS & GYNECOLOGY

## 2024-09-11 PROCEDURE — 2500000005 HC RX 250 GENERAL PHARMACY W/O HCPCS

## 2024-09-11 PROCEDURE — 81025 URINE PREGNANCY TEST: CPT | Performed by: ANESTHESIOLOGY

## 2024-09-11 PROCEDURE — 3600000009 HC OR TIME - EACH INCREMENTAL 1 MINUTE - PROCEDURE LEVEL FOUR: Performed by: OBSTETRICS & GYNECOLOGY

## 2024-09-11 PROCEDURE — 3600000004 HC OR TIME - INITIAL BASE CHARGE - PROCEDURE LEVEL FOUR: Performed by: OBSTETRICS & GYNECOLOGY

## 2024-09-11 PROCEDURE — 3700000001 HC GENERAL ANESTHESIA TIME - INITIAL BASE CHARGE: Performed by: OBSTETRICS & GYNECOLOGY

## 2024-09-11 PROCEDURE — 36415 COLL VENOUS BLD VENIPUNCTURE: CPT

## 2024-09-11 PROCEDURE — A58570 PR LAPAROSCOPY W TOT HYSTERECT UTERUS 250 GRAM OR LESS

## 2024-09-11 PROCEDURE — 2720000007 HC OR 272 NO HCPCS: Performed by: OBSTETRICS & GYNECOLOGY

## 2024-09-11 PROCEDURE — 7100000001 HC RECOVERY ROOM TIME - INITIAL BASE CHARGE: Performed by: OBSTETRICS & GYNECOLOGY

## 2024-09-11 PROCEDURE — 88307 TISSUE EXAM BY PATHOLOGIST: CPT | Mod: TC,GEALAB,WESLAB | Performed by: OBSTETRICS & GYNECOLOGY

## 2024-09-11 PROCEDURE — 2500000004 HC RX 250 GENERAL PHARMACY W/ HCPCS (ALT 636 FOR OP/ED): Performed by: ANESTHESIOLOGY

## 2024-09-11 PROCEDURE — 7100000010 HC PHASE TWO TIME - EACH INCREMENTAL 1 MINUTE: Performed by: OBSTETRICS & GYNECOLOGY

## 2024-09-11 PROCEDURE — 88307 TISSUE EXAM BY PATHOLOGIST: CPT | Performed by: STUDENT IN AN ORGANIZED HEALTH CARE EDUCATION/TRAINING PROGRAM

## 2024-09-11 PROCEDURE — 7100000009 HC PHASE TWO TIME - INITIAL BASE CHARGE: Performed by: OBSTETRICS & GYNECOLOGY

## 2024-09-11 PROCEDURE — A58570 PR LAPAROSCOPY W TOT HYSTERECT UTERUS 250 GRAM OR LESS: Performed by: ANESTHESIOLOGY

## 2024-09-11 PROCEDURE — 86901 BLOOD TYPING SEROLOGIC RH(D): CPT | Performed by: OBSTETRICS & GYNECOLOGY

## 2024-09-11 PROCEDURE — 3700000002 HC GENERAL ANESTHESIA TIME - EACH INCREMENTAL 1 MINUTE: Performed by: OBSTETRICS & GYNECOLOGY

## 2024-09-11 PROCEDURE — 7100000002 HC RECOVERY ROOM TIME - EACH INCREMENTAL 1 MINUTE: Performed by: OBSTETRICS & GYNECOLOGY

## 2024-09-11 RX ORDER — MIDAZOLAM HYDROCHLORIDE 1 MG/ML
INJECTION INTRAMUSCULAR; INTRAVENOUS AS NEEDED
Status: DISCONTINUED | OUTPATIENT
Start: 2024-09-11 | End: 2024-09-11

## 2024-09-11 RX ORDER — ACETAMINOPHEN 325 MG/1
650 TABLET ORAL EVERY 4 HOURS PRN
Status: DISCONTINUED | OUTPATIENT
Start: 2024-09-11 | End: 2024-09-11 | Stop reason: HOSPADM

## 2024-09-11 RX ORDER — KETOROLAC TROMETHAMINE 30 MG/ML
INJECTION, SOLUTION INTRAMUSCULAR; INTRAVENOUS AS NEEDED
Status: DISCONTINUED | OUTPATIENT
Start: 2024-09-11 | End: 2024-09-11

## 2024-09-11 RX ORDER — ROCURONIUM BROMIDE 10 MG/ML
INJECTION, SOLUTION INTRAVENOUS AS NEEDED
Status: DISCONTINUED | OUTPATIENT
Start: 2024-09-11 | End: 2024-09-11

## 2024-09-11 RX ORDER — CEFAZOLIN 1 G/1
INJECTION, POWDER, FOR SOLUTION INTRAVENOUS AS NEEDED
Status: DISCONTINUED | OUTPATIENT
Start: 2024-09-11 | End: 2024-09-11

## 2024-09-11 RX ORDER — FENTANYL CITRATE 50 UG/ML
INJECTION, SOLUTION INTRAMUSCULAR; INTRAVENOUS AS NEEDED
Status: DISCONTINUED | OUTPATIENT
Start: 2024-09-11 | End: 2024-09-11

## 2024-09-11 RX ORDER — SODIUM CHLORIDE, SODIUM LACTATE, POTASSIUM CHLORIDE, CALCIUM CHLORIDE 600; 310; 30; 20 MG/100ML; MG/100ML; MG/100ML; MG/100ML
100 INJECTION, SOLUTION INTRAVENOUS CONTINUOUS
Status: DISCONTINUED | OUTPATIENT
Start: 2024-09-11 | End: 2024-09-11 | Stop reason: HOSPADM

## 2024-09-11 RX ORDER — PROPOFOL 10 MG/ML
INJECTION, EMULSION INTRAVENOUS AS NEEDED
Status: DISCONTINUED | OUTPATIENT
Start: 2024-09-11 | End: 2024-09-11

## 2024-09-11 RX ORDER — ACETAMINOPHEN 325 MG/1
975 TABLET ORAL ONCE
Status: COMPLETED | OUTPATIENT
Start: 2024-09-11 | End: 2024-09-11

## 2024-09-11 RX ORDER — CELECOXIB 400 MG/1
400 CAPSULE ORAL ONCE
Status: COMPLETED | OUTPATIENT
Start: 2024-09-11 | End: 2024-09-11

## 2024-09-11 RX ORDER — ONDANSETRON HYDROCHLORIDE 2 MG/ML
INJECTION, SOLUTION INTRAVENOUS AS NEEDED
Status: DISCONTINUED | OUTPATIENT
Start: 2024-09-11 | End: 2024-09-11

## 2024-09-11 RX ORDER — ONDANSETRON HYDROCHLORIDE 2 MG/ML
8 INJECTION, SOLUTION INTRAVENOUS ONCE
Status: DISCONTINUED | OUTPATIENT
Start: 2024-09-11 | End: 2024-09-11 | Stop reason: HOSPADM

## 2024-09-11 RX ORDER — HYDROMORPHONE HYDROCHLORIDE 2 MG/ML
INJECTION, SOLUTION INTRAMUSCULAR; INTRAVENOUS; SUBCUTANEOUS AS NEEDED
Status: DISCONTINUED | OUTPATIENT
Start: 2024-09-11 | End: 2024-09-11

## 2024-09-11 RX ORDER — OXYCODONE HYDROCHLORIDE 5 MG/1
5 TABLET ORAL EVERY 6 HOURS PRN
Qty: 8 TABLET | Refills: 0 | Status: SHIPPED | OUTPATIENT
Start: 2024-09-11 | End: 2024-09-13

## 2024-09-11 RX ORDER — GABAPENTIN 300 MG/1
600 CAPSULE ORAL ONCE
Status: COMPLETED | OUTPATIENT
Start: 2024-09-11 | End: 2024-09-11

## 2024-09-11 RX ORDER — ALBUTEROL SULFATE 0.83 MG/ML
2.5 SOLUTION RESPIRATORY (INHALATION) ONCE AS NEEDED
Status: DISCONTINUED | OUTPATIENT
Start: 2024-09-11 | End: 2024-09-11 | Stop reason: HOSPADM

## 2024-09-11 RX ORDER — LIDOCAINE HYDROCHLORIDE 20 MG/ML
INJECTION, SOLUTION INFILTRATION; PERINEURAL AS NEEDED
Status: DISCONTINUED | OUTPATIENT
Start: 2024-09-11 | End: 2024-09-11

## 2024-09-11 RX ORDER — SCOLOPAMINE TRANSDERMAL SYSTEM 1 MG/1
1 PATCH, EXTENDED RELEASE TRANSDERMAL ONCE
Status: DISCONTINUED | OUTPATIENT
Start: 2024-09-11 | End: 2024-09-11 | Stop reason: HOSPADM

## 2024-09-11 RX ORDER — MAGNESIUM SULFATE HEPTAHYDRATE 500 MG/ML
INJECTION, SOLUTION INTRAMUSCULAR; INTRAVENOUS AS NEEDED
Status: DISCONTINUED | OUTPATIENT
Start: 2024-09-11 | End: 2024-09-11

## 2024-09-11 RX ORDER — GLYCOPYRROLATE 0.2 MG/ML
INJECTION INTRAMUSCULAR; INTRAVENOUS AS NEEDED
Status: DISCONTINUED | OUTPATIENT
Start: 2024-09-11 | End: 2024-09-11

## 2024-09-11 RX ORDER — BUPIVACAINE HYDROCHLORIDE 5 MG/ML
INJECTION, SOLUTION PERINEURAL AS NEEDED
Status: DISCONTINUED | OUTPATIENT
Start: 2024-09-11 | End: 2024-09-11 | Stop reason: HOSPADM

## 2024-09-11 SDOH — HEALTH STABILITY: MENTAL HEALTH: CURRENT SMOKER: 0

## 2024-09-11 ASSESSMENT — PAIN - FUNCTIONAL ASSESSMENT
PAIN_FUNCTIONAL_ASSESSMENT: 0-10

## 2024-09-11 ASSESSMENT — PAIN SCALES - GENERAL
PAINLEVEL_OUTOF10: 2
PAINLEVEL_OUTOF10: 0 - NO PAIN
PAINLEVEL_OUTOF10: 0 - NO PAIN
PAINLEVEL_OUTOF10: 7
PAINLEVEL_OUTOF10: 3
PAINLEVEL_OUTOF10: 0 - NO PAIN
PAINLEVEL_OUTOF10: 2
PAINLEVEL_OUTOF10: 2
PAINLEVEL_OUTOF10: 0 - NO PAIN
PAINLEVEL_OUTOF10: 2
PAINLEVEL_OUTOF10: 2
PAINLEVEL_OUTOF10: 3

## 2024-09-11 ASSESSMENT — PAIN DESCRIPTION - DESCRIPTORS: DESCRIPTORS: CRAMPING

## 2024-09-11 ASSESSMENT — PAIN DESCRIPTION - LOCATION: LOCATION: INCISION

## 2024-09-11 NOTE — ANESTHESIA PREPROCEDURE EVALUATION
Patient: Geeta Ramos    Procedure Information       Date/Time: 24 0705    Procedures:       HYSTERECTOMY LAPAROSCOPY      SALPINGECTOMY LAPAROSCOPY (Bilateral)      CYSTOSCOPY RIGID    Location: [facility] / [facility]    Surgeons: Marilyn Reese, DO            Relevant Problems   Anesthesia   (+) Awareness under anesthesia   (+) PONV (postoperative nausea and vomiting)      Neuro   (+) Duane's syndrome of left eye      GYN   (+) Abnormal uterine bleeding (AUB)     Vitals:    24 0611   BP: 142/80   Pulse: 68   Resp: 18   Temp: 36.7 °C (98.1 °F)   SpO2: 100%       Past Surgical History:   Procedure Laterality Date    CHOLECYSTECTOMY  2016    Cholecystectomy    COLONOSCOPY  2016    Colonoscopy (Fiberoptic)    DILATION AND CURETTAGE OF UTERUS  2016    Dilation And Curettage    ENDOMETRIAL ABLATION      LAPAROSCOPY DIAGNOSTIC / BIOPSY / ASPIRATION / LYSIS  2016    Exploratory Laparoscopy    SINUS SURGERY  2016    Sinus Surgery     Past Medical History:   Diagnosis Date    Abnormal uterine bleeding (AUB)     Class 3 severe obesity with body mass index (BMI) of 40.0 to 44.9 in adult (Multi) 2024    40.75 kg/m²    Encounter for full-term uncomplicated delivery ([facility])      (spontaneous vaginal delivery)    Endometriosis 2014    Gastro-esophageal reflux disease without esophagitis     Gastroesophageal reflux disease, esophagitis presence not specified    Hirsutism 2024    History of pregnancy induced hypertension 2018    History of spontaneous      History of thyroid nodule     Hypothyroidism, unspecified     Mass of axillary tail of breast 2024    Mild to moderate pre-eclampsia, complicating childbirth ([facility])     PONV (postoperative nausea and vomiting)     Postpartum depression        Current Facility-Administered Medications:     ceFAZolin (Ancef) 3 g in dextrose 5% 100 mL IV, 3 g, intravenous, Once, Marilyn ANDREW  Hugh,     lactated Ringer's infusion, 100 mL/hr, intravenous, Continuous, Manny Rosas MD, Last Rate: 100 mL/hr at 09/11/24 0637, 100 mL/hr at 09/11/24 0637  Prior to Admission medications    Medication Sig Start Date End Date Taking? Authorizing Provider   chlorhexidine (Hibiclens) 4 % external liquid Apply 1 Application topically once daily for 5 days. Use per CPM/PAT provided instructions 9/5/24 9/10/24  Silva Zepeda, APRN-CNP     Allergies   Allergen Reactions    Codeine Itching, Nausea Only, Other, Shortness of breath and Nausea/vomiting    Bee Venom Protein (Honey Bee) Swelling     Social History     Tobacco Use    Smoking status: Former     Types: Cigarettes    Smokeless tobacco: Never   Substance Use Topics    Alcohol use: Yes     Comment: rare         Chemistry    Lab Results   Component Value Date/Time     (L) 02/22/2020 1015    K 3.8 02/22/2020 1015     02/22/2020 1015    CO2 27 02/22/2020 1015    BUN 12 02/22/2020 1015    CREATININE 0.89 02/22/2020 1015    Lab Results   Component Value Date/Time    CALCIUM 9.5 02/22/2020 1015    ALKPHOS 43 02/22/2020 1015    AST 16 02/22/2020 1015    ALT 20 02/22/2020 1015    BILITOT 1.1 02/22/2020 1015          Lab Results   Component Value Date/Time    WBC 5.8 09/05/2024 0954    HGB 14.4 09/05/2024 0954    HCT 42.1 09/05/2024 0954     09/05/2024 0954     Lab Results   Component Value Date/Time    PROTIME 11.7 01/23/2018 0747    INR 1.1 01/23/2018 0747     HCG negative    Clinical information reviewed:   Tobacco  Allergies  Meds   Med Hx  Surg Hx   Fam Hx  Soc Hx        NPO Detail:  NPO/Void Status  Carbohydrate Drink Given Prior to Surgery? : N  Date of Last Liquid: 09/10/24  Time of Last Liquid: 2200  Date of Last Solid: 09/10/24  Time of Last Solid: 2200  Last Intake Type: Clear fluids  Time of Last Void: 0630         Physical Exam    Airway  Mallampati: II  TM distance: >3 FB  Neck ROM: full     Cardiovascular   Rhythm:  regular  Rate: normal     Dental        Pulmonary   Breath sounds clear to auscultation     Abdominal            Anesthesia Plan    History of general anesthesia?: yes  History of complications of general anesthesia?: yes    ASA 3     general     The patient is not a current smoker.    intravenous induction   Postoperative administration of opioids is intended.  Trial extubation is planned.  Anesthetic plan and risks discussed with patient.  Use of blood products discussed with patient who consented to blood products.    Plan discussed with CAA.

## 2024-09-11 NOTE — OP NOTE
Date: 2024  OR Location: [facility] OR    Name: Geeta Ramos, : 1985, Age: 39 y.o., MRN: [id number], Sex: female    Diagnosis  Pre-op Diagnosis      * Abnormal uterine bleeding (AUB) [N93.9] Post-op Diagnosis     * Abnormal uterine bleeding (AUB) [N93.9]     Procedures  HYSTERECTOMY LAPAROSCOPY  12170 - [place] LAPAROSCOPY W TOTAL HYSTERECTOMY UTERUS 250 GM/<    SALPINGECTOMY LAPAROSCOPY  30694 - [place] LAPAROSCOPY W/RMVL ADNEXAL STRUCTURES    CYSTOSCOPY RIGID  00608 - IL CYSTOURETHROSCOPY      Surgeons      * Marilyn Reese - Primary    Resident/Fellow/Other Assistant:  Surgeons and Role:     * Kylee Ordonez MD - Assisting    Procedure Summary  Anesthesia: Anesthesia type not filed in the log.  ASA: III  Anesthesia Staff: Anesthesiologist: Apolinar Daley DO  CRNA: CINTIA Cabrera-CRNA  Estimated Blood Loss: 100 mL  Intra-op Medications: * Intraprocedure medication information is unavailable because the case start and end events have not been set *           Anesthesia Record               Intraprocedure I/O Totals          Output    Urine 150 mL    Total Blood Loss - Surgical Delivery (mL) 100 mL    Total Output 250 mL       Other (could not be determined as input or output)    Surgical Delivery Estimated Blood Loss (mL) 100          Specimen:   ID Type Source Tests Collected by Time   1 : UTERUS, CERVIX, BILATERAL FALLOPIAN TUBES FOR DX IUB Tissue UTERUS, CERVIX, AND BILATERAL FALLOPIAN TUBES SURGICAL PATHOLOGY EXAM Marilyn Reese DO 2024 0851        Staff:   Shey: Reina  Circulator: Soraya  Scrub Person: Marlon  Scrub Person: Boni         Procedure Details:  The patient was seen in the preoperative area. The site of surgery was properly noted/marked if necessary per policy. The patient has been actively warmed in preoperative area. Preoperative antibiotics have been ordered and given within 1 hours of incision. Venous thrombosis prophylaxis have been ordered including bilateral  sequential compression devices       Due to the complexity of the surgical case an assistant surgeon was necessary to complete the case.  During the case Dr. Ordonez, participated though out the entire    The patient presented and was taken to the OR, where general anesthesia was induced without issue. The patient was positioned dorsal lithotomy with feel in Yellow Brian stirrups, and prepped and draped in the usual sterile fashion. A speculum was placed vaginally and Dr. Ordonez provided retraction and optimal visualization for safe insertion of the uterine manipulator. The cervix identified and grasped at the anterior lip using a single tooth tenaculum. An anterior stay suture was placed and the Advincula uterine manipulator placed. The single tooth tenaculum and speculum were removed from the vagina, and a whyte catheter placed in the urethra. Physician gloves were changed and attention turned to the abdomen. The infraumbilical skin was anesthetized using 0.5% Marcaine and a 12 mm incision made. The Veres needle was introduced and an opening pressure of 6 mmHg noted. The abdomen was filled with CO2 gas to a pressure of 15 mmHg and the Veres needle withdrawn. A 12 mm port was placed and the scope introduced into the abdominal cavity. Atraumatic port placement was confirmed. At this time the following concerns were still noted, requiring ongoing assistance by Dr. Ordonez: significant scarring of the tubes/ovaries and adnexa. Bilateral 5 mm ports were then placed under direct visualization of the scope. The patient was placed in trendelenburg and viscera gently swept out of the pelvis. A general surveillance was done with the above findings noted. The uterus was anteverted and attention turned to the left adnexa. The left tube and ovary were identified. The left tube was significantly scarred to the ovary and uterosacral ligaments. The fimbriated end was grasped and multiple adhesions were carefully taken down using the  Ligasure device. The tube was further freed with blunt dissection. The tube was grasped, followed out to the fimbriated end, and held on gentle tension. The left tube was excised using the Ligasure device, working fimbriated to cornual end. The left round ligament was grasped, cauterized, and transected using the Ligasure device. The left utero-ovarian and broad ligaments were then dissected off the uterus, working cornual to cervical end, using the Ligasure device. The left uterine artery was skeletonized, cauterized, and transected, using the Ligasure device. The bladder flap was started, working left lateral to medial, using the Ligasure device. Attention was then turned to the right adnexa. Dissection of the right adnexa was performed by Dr. Ordonez. The right tube and ovary were identified. The right tube was grasped, followed out to the fimbriated end, and held on gentle tension. The right tube was excised using the Ligasure device, working fimbriated to cornual end. The right round ligament was grasped, cauterized, and transected using the Ligasure device. The right utero-ovarian and broad ligaments were then dissected off the uterus, working cornual to cervical end, using the Ligasure device. The right uterine artery was skeletonized, cauterized, and transected, using the Ligasure device. The bladder flap was completed, working right lateral to medial, using the Ligasure device. The remaining adnexal tissue was dissected off the lower uterine segment and cervix bilaterally using the Ligasure device in several straight bites. At this time the pubo-cervical fascia was visualized and the manipulator cervical ring could be palpated. The vaginal balloon was filled to maintain pneumoperitoneum. The uterus was anteverted and the colpotomy made posteriorly and carried around circumferentially, taking care to stay in the boundaries of the cervical ring. The uterus, cervix, and tubes were delivered vaginally and the  vaginal balloon replaced to maintain pneumoperitoneum. The vaginal cuff was closed using 0 V-lock sutures in a running fashion from an abdominal approach. The pelvis was irrigated and suctioned of all blood, clot, and debris. Hemostasis was noted. Attention was then turned to the bladder. The whyte catheter was removed and a cystoscopy done. There were no defects or sutures noted in the bladder and bilateral UO jets were visualized. The bladder was drained of cystoscopy fluid and the cystoscope withdrawn. The whyte catheter was left out at this time. Physician gloves were changed and attention again turned to the abdomen. The abdominal ports were removed and the abdomen relieved of all CO2 gas. The fascia at the 12 mm port was closed using 0-Vicryl suture. The skin was closed using 4-0 Vicryl suture. The patient was taken out of dorsal lithotomy and anesthesia reversed without issue. The patient was taken back to the PACU in good condition. All counts were correct.    Findings: Moderately enlarged uterus that is otherwise grossly normal. Grossly normal bilateral tubes/ovaries. Significant scarring of the pelvis. Endometriosis.    Complications:  None; patient tolerated the procedure well.     Disposition: PACU - hemodynamically stable.  Condition: stable  Marilyn Reese DO

## 2024-09-11 NOTE — DISCHARGE SUMMARY
Discharge Summary    Admission Date: 9/11/2024  Discharge Date: 9/11/24    Discharge Diagnosis  Abnormal uterine bleeding (AUB)    Hospital Course  Patient presented and underwent TLH, bilateral salpingectomy, cystoscopy. There were no postoperative complications.      Procedures:  TLH, bilateral salpingectomy, cystoscopy      Pertinent Physical Exam At Time of Discharge    General: Examination reveals a well developed, well nourished, female, in no acute distress. She is alert and cooperative.  Lungs: clear to auscultation bilaterally.  Cardiac: regular rate and rhythm, S1, S2 normal, no murmur, click, rub or gallop.  Abdomen: soft, appropriately tender, non distended.  Psychological: awake and alert; oriented to person, place, and time.    Last Vitals:  Temp Pulse Resp BP MAP Pulse Ox   36.7 °C (98.1 °F) 68 18 142/80   100 %     Discharge Meds     Your medication list        START taking these medications        Instructions Last Dose Given Next Dose Due   oxyCODONE 5 mg immediate release tablet  Commonly known as: Roxicodone      Take 1 tablet (5 mg) by mouth every 6 hours if needed for severe pain (7 - 10) for up to 2 days.              STOP taking these medications      chlorhexidine 4 % external liquid  Commonly known as: Hibiclens                  Where to Get Your Medications        You can get these medications from any pharmacy    Bring a paper prescription for each of these medications  oxyCODONE 5 mg immediate release tablet          Complications Requiring Follow-Up  None    Test Results Pending At Discharge  Pending Labs       Order Current Status    Surgical Pathology Exam Collected (09/11/24 0851)            Outpatient Follow-Up  Future Appointments   Date Time Provider Department Center   10/3/2024 11:00 AM Marilyn Reese DO [id number]       I spent 30 minutes in the professional and overall care of this patient.      Marilyn Reese DO

## 2024-09-11 NOTE — ANESTHESIA POSTPROCEDURE EVALUATION
Patient: Geeta Ramos    Procedure Summary       Date: 09/11/24 Room / Location: [facility] OR 07 / [facility] OR    Anesthesia Start: 0739 Anesthesia Stop: 0940    Procedures:       HYSTERECTOMY LAPAROSCOPY      SALPINGECTOMY LAPAROSCOPY (Bilateral)      CYSTOSCOPY RIGID Diagnosis:       Abnormal uterine bleeding (AUB)      (Abnormal uterine bleeding (AUB) [N93.9])    Surgeons: Marilyn Reese DO Responsible Provider: Apolinar Daley DO    Anesthesia Type: general ASA Status: 3            Anesthesia Type: general    Vitals Value Taken Time   /74 09/11/24 0940   Temp 36.3 09/11/24 0940   Pulse 61 09/11/24 0940   Resp 14 09/11/24 0940   SpO2 100 09/11/24 0940       Anesthesia Post Evaluation    Patient location during evaluation: PACU  Patient participation: complete - patient participated  Level of consciousness: awake and alert  Pain management: satisfactory to patient  Airway patency: patent  Two or more strategies used to mitigate risk of obstructive sleep apnea  Cardiovascular status: acceptable and blood pressure returned to baseline  Respiratory status: acceptable, face mask and nasal airway  Hydration status: acceptable  Postoperative Nausea and Vomiting: none      There were no known notable events for this encounter.

## 2024-09-11 NOTE — ANESTHESIA PROCEDURE NOTES
Airway  Date/Time: 9/11/2024 7:50 AM  Urgency: elective    Airway not difficult    Staffing  Performed: CRNA   Authorized by: Apolinar Daley DO    Performed by: CINTIA Cabrera-NIKHIL  Patient location during procedure: OR    Indications and Patient Condition  Indications for airway management: anesthesia and airway protection  Spontaneous Ventilation: absent  Sedation level: deep  Preoxygenated: yes  Patient position: sniffing  MILS maintained throughout  Mask difficulty assessment: 2 - vent by mask + OA or adjuvant +/- NMBA  Planned trial extubation    Final Airway Details  Final airway type: endotracheal airway      Successful airway: ETT  Cuffed: yes   Successful intubation technique: video laryngoscopy  Facilitating devices/methods: intubating stylet  Endotracheal tube insertion site: oral  Blade type: [employer]  Blade size: #3  ETT size (mm): 8.0  Cormack-Lehane Classification: grade I - full view of glottis  Placement verified by: chest auscultation and capnometry   Cuff volume (mL): 7  Measured from: lips  ETT to lips (cm): 21  Number of attempts at approach: 1

## 2024-09-11 NOTE — ANESTHESIA PROCEDURE NOTES
Peripheral IV  Date/Time: 9/11/2024 7:55 AM  Inserted by: CINTIA Cabrera-CRNA    Placement  Needle size: 20 G  Laterality: left  Location: hand  Local anesthetic: none  Site prep: alcohol  Technique: anatomical landmarks  Attempts: 1

## 2024-09-11 NOTE — DISCHARGE INSTRUCTIONS
[facility]  [address], [place]  [address]  [address], [place] [address]  Tel: [phone]  ([place]) or [phone] ([place])  Home Going Instructions after Laparoscopic Hysterectomy:    Activity:   You should rest on the day of surgery  You may not return to work until cleared by your surgeon. If you have [facility] paperwork to be signed, please fill out your portion and drop off at either the [place] or [place] offices to be reviewed and signed.   You may have light bleeding or spotting for a few weeks following hysterectomy.   Use only sanitary pads for bleeding, do not use tampons  Please abstain from intercourse until you are cleared by your surgeon  Please do not fully submerge in water (pool/hot tub/bath tub) even in your own home. Shower is fine.  Do not drive for 24 hours after anesthesia, while taking narcotic pain management, and while requiring short interval Tylenol/Ibuprofen for pain   Do not lift anything greater than 10 pounds until after your 2 week post operative visit in the office  Do not consume alcohol for 24 hours after anesthesia or while using any narcotic pain medication    Anesthesia:  Drink small amounts of liquids initially and then slowly increase your intake of food. Drinking fluids will keep your bowels regular.   Avoid foods that are sweet, spicy or hard to digest today.  If you feel nauseated, rest your stomach for one hour, and then try drinking clear liquids again.  You may take a stool softener or miralax/milk of magnesia to help with constipation that may occur after anesthesia.  Please make sure a responsible adult is with you for at least 24 hours after surgery and do not drive or make important decisions during this time. Anesthesia may affect your judgment, coordination, and reaction time.    Pain:  If you experience any post-op pain, pain can be managed by taking Ibuprofen and/or Tylenol. If you have been  sent home with a prescription for Oxycodone this should be a second line option only if Ibuprofen/Tylenol are not managing your pain.   You may additionally use heat or cool packs to alleviate discomfort    Follow up:  Follow up with your surgeon in the office 2 weeks after your procedure for an incision check    When to call your provider:  Vaginal bleeding that is more than a normal period that doesn't taper down.  Bleeding through 1 sanitary pad an hour.  Any clots the size of an egg or bigger.  Fever of 100.4 or higher with chills.  Unusual or foul smelling discharge.  Worsening abdominal pain.  Always call the office to be connected with your surgeon or the physician on call for the practice. If your call is during office hours (Monday through Friday 9:00 AM to 4:00 PM) press 1 to be connected to the . If you are calling after hours you will be transferred to our answering service and they will connect you with the physician on call.    Marilyn Reese, DO

## 2024-09-17 ENCOUNTER — APPOINTMENT (OUTPATIENT)
Dept: OBSTETRICS AND GYNECOLOGY | Facility: CLINIC | Age: 39
End: 2024-09-17
Payer: COMMERCIAL

## 2024-10-03 ENCOUNTER — APPOINTMENT (OUTPATIENT)
Dept: OBSTETRICS AND GYNECOLOGY | Facility: CLINIC | Age: 39
End: 2024-10-03
Payer: COMMERCIAL

## 2024-10-03 VITALS
HEIGHT: 70 IN | SYSTOLIC BLOOD PRESSURE: 110 MMHG | DIASTOLIC BLOOD PRESSURE: 78 MMHG | BODY MASS INDEX: 41.37 KG/M2 | WEIGHT: 289 LBS

## 2024-10-03 DIAGNOSIS — R39.9 UTI SYMPTOMS: ICD-10-CM

## 2024-10-03 DIAGNOSIS — Z48.89 SUTURE CHECK: Primary | ICD-10-CM

## 2024-10-03 PROBLEM — E03.9 HYPOTHYROIDISM: Status: ACTIVE | Noted: 2024-10-03

## 2024-10-03 PROBLEM — N92.0 MENORRHAGIA: Status: RESOLVED | Noted: 2024-10-03 | Resolved: 2024-10-03

## 2024-10-03 LAB
POC APPEARANCE, URINE: CLEAR
POC BILIRUBIN, URINE: NEGATIVE
POC BLOOD, URINE: NEGATIVE
POC COLOR, URINE: YELLOW
POC GLUCOSE, URINE: NEGATIVE MG/DL
POC KETONES, URINE: NEGATIVE MG/DL
POC LEUKOCYTES, URINE: NEGATIVE
POC NITRITE,URINE: NEGATIVE
POC PH, URINE: 6.5 PH
POC PROTEIN, URINE: NEGATIVE MG/DL
POC SPECIFIC GRAVITY, URINE: 1.01
POC UROBILINOGEN, URINE: 0.2 EU/DL

## 2024-10-03 PROCEDURE — 99024 POSTOP FOLLOW-UP VISIT: CPT | Performed by: OBSTETRICS & GYNECOLOGY

## 2024-10-03 PROCEDURE — 87086 URINE CULTURE/COLONY COUNT: CPT

## 2024-10-03 PROCEDURE — 81002 URINALYSIS NONAUTO W/O SCOPE: CPT | Performed by: OBSTETRICS & GYNECOLOGY

## 2024-10-03 PROCEDURE — 1036F TOBACCO NON-USER: CPT | Performed by: OBSTETRICS & GYNECOLOGY

## 2024-10-03 PROCEDURE — 3008F BODY MASS INDEX DOCD: CPT | Performed by: OBSTETRICS & GYNECOLOGY

## 2024-10-03 ASSESSMENT — ENCOUNTER SYMPTOMS
CONSTITUTIONAL NEGATIVE: 1
CARDIOVASCULAR NEGATIVE: 1
GASTROINTESTINAL NEGATIVE: 1
MUSCULOSKELETAL NEGATIVE: 1
PSYCHIATRIC NEGATIVE: 1
RESPIRATORY NEGATIVE: 1
NEUROLOGICAL NEGATIVE: 1

## 2024-10-03 NOTE — PROGRESS NOTES
"Subjective   Patient ID: Geeta Ramos is a 39 y.o. female who presents for POST OPERATIVE VISIT.  HPI    Patient presents for post op visit. She is 3 weeks post TLH, cystoscopy. She is doing well and states her pain and bleeding have stopped. She denies pelvic pain, discharge, itching, burning, rash, odor. She notes a stronger odor to her urine.     Review of Systems   Constitutional: Negative.    Respiratory: Negative.     Cardiovascular: Negative.    Gastrointestinal: Negative.    Genitourinary: Negative.    Musculoskeletal: Negative.    Skin: Negative.    Neurological: Negative.    Psychiatric/Behavioral: Negative.         Objective   Visit Vitals  /78   Ht 1.778 m (5' 10\")   Wt 131 kg (289 lb)   LMP 03/01/2023   BMI 41.47 kg/m²   OB Status Hysterectomy   Smoking Status Former   BSA 2.54 m²       Physical Exam  Constitutional:       Appearance: Normal appearance.   Pulmonary:      Effort: Pulmonary effort is normal.   Skin:     Comments: Incisions c/d/I, no erythema/induration   Neurological:      Mental Status: She is alert.   Psychiatric:         Mood and Affect: Mood normal.         Assessment/Plan   Problem List Items Addressed This Visit    None  Visit Diagnoses         Codes    Suture check    -  Primary Z48.89          Discussed findings on exam  Patient is healing well  May return to driving and light to moderate lifting/activity  Continue pelvic rest  RTO 4 weeks       Marilyn Reese DO 10/03/24 11:46 AM   "

## 2024-10-03 NOTE — PATIENT INSTRUCTIONS
Follow Up from Hysterectomy- 2 weeks:  You were seen in office today for your 2 week postoperative visit following hysterectomy.   Skin incisions are healing well and all glue/bandages were removed.  You may return to light to moderate lifting (up to 50 pounds). Continue to abstain from heavy lifting until your next visit  You may return to driving, if comfortable, at this time  Continue pelvic rest: no intercourse (including sexual toys) or submersion in water such as soaking in a pool, hot tub, or bath tub, even in your own home.  Please make an appointment for a 6 weeks follow up visit. Call the office if you are having any heavy bleeding, foul smelling vaginal discharge, increased or new pain. [phone] ([place]) or [phone] ([place])

## 2024-10-05 LAB — BACTERIA UR CULT: NORMAL

## 2024-10-10 ENCOUNTER — APPOINTMENT (OUTPATIENT)
Dept: OBSTETRICS AND GYNECOLOGY | Facility: CLINIC | Age: 39
End: 2024-10-10
Payer: COMMERCIAL

## 2024-10-24 ENCOUNTER — APPOINTMENT (OUTPATIENT)
Dept: OBSTETRICS AND GYNECOLOGY | Facility: CLINIC | Age: 39
End: 2024-10-24
Payer: COMMERCIAL

## 2024-10-24 VITALS
SYSTOLIC BLOOD PRESSURE: 120 MMHG | DIASTOLIC BLOOD PRESSURE: 78 MMHG | WEIGHT: 293 LBS | HEIGHT: 70 IN | BODY MASS INDEX: 41.95 KG/M2

## 2024-10-24 DIAGNOSIS — Z48.89 AFTERCARE FOLLOWING SURGERY: Primary | ICD-10-CM

## 2024-10-24 PROCEDURE — 99024 POSTOP FOLLOW-UP VISIT: CPT | Performed by: OBSTETRICS & GYNECOLOGY

## 2024-10-24 PROCEDURE — 1036F TOBACCO NON-USER: CPT | Performed by: OBSTETRICS & GYNECOLOGY

## 2024-10-24 PROCEDURE — 3008F BODY MASS INDEX DOCD: CPT | Performed by: OBSTETRICS & GYNECOLOGY

## 2024-10-24 RX ORDER — LEVOTHYROXINE SODIUM 100 UG/1
100 TABLET ORAL DAILY
COMMUNITY
Start: 2024-10-22

## 2024-10-24 ASSESSMENT — ENCOUNTER SYMPTOMS
MUSCULOSKELETAL NEGATIVE: 1
RESPIRATORY NEGATIVE: 1
NEUROLOGICAL NEGATIVE: 1
GASTROINTESTINAL NEGATIVE: 1
PSYCHIATRIC NEGATIVE: 1
CONSTITUTIONAL NEGATIVE: 1
CARDIOVASCULAR NEGATIVE: 1

## 2024-10-24 NOTE — PROGRESS NOTES
"Subjective   Patient ID: Geeta Ramos is a 39 y.o. female who presents for POST-OP HYSTERECTOMY.  HPI    Patient presents for post op visit. She is 6 weeks post TLH, bilateral salpingectomy, cystoscopy. Her bleeding and pain have resolved. She denies pelvic pain, discharge, itching, burning, rash, dysuria, odor.     Review of Systems   Constitutional: Negative.    Respiratory: Negative.     Cardiovascular: Negative.    Gastrointestinal: Negative.    Genitourinary: Negative.    Musculoskeletal: Negative.    Skin: Negative.    Neurological: Negative.    Psychiatric/Behavioral: Negative.         Objective   Visit Vitals  /78   Ht 1.778 m (5' 10\")   Wt 133 kg (293 lb 12.8 oz)   LMP 03/01/2023   BMI 42.16 kg/m²   OB Status Hysterectomy   Smoking Status Former   BSA 2.56 m²       Physical Exam  Constitutional:       Appearance: Normal appearance.   Pulmonary:      Effort: Pulmonary effort is normal.   Genitourinary:     Comments: Vulva normal in appearance without discoloration, rashes, lesions. Vagina is negative for blood, discharge, lesions. Uterus and cervix surgically absent, vaginal cuff healing well. No adnexal masses/tenderness. IUD strings are visualized.      Neurological:      Mental Status: She is alert.   Psychiatric:         Mood and Affect: Mood normal.         Behavior: Behavior normal.         Assessment/Plan   Problem List Items Addressed This Visit    None  Visit Diagnoses         Codes    Aftercare following surgery    -  Primary Z48.89          Discussed findings on exam  Patient healing well and may return to most activities  Continue pelvic rest 2-3 more weeks  Precautions given  RTO 1 year IRINA Reese DO 10/24/24 12:02 PM   "

## 2024-10-24 NOTE — PATIENT INSTRUCTIONS
Follow Up from Hysterectomy- 6 weeks:  You were seen in office today for your 2 week postoperative visit following hysterectomy.   Skin incisions are well healed  You may return to normal activities  You may return to driving, if comfortable, at this time  Continue pelvic rest: no intercourse (including sexual toys) for 2-3 more weeks  Call the office if you are having any heavy bleeding, foul smelling vaginal discharge, increased or new pain. [phone] ([place]) or [phone] ([place])

## 2024-11-29 ENCOUNTER — TELEPHONE (OUTPATIENT)
Dept: OBSTETRICS AND GYNECOLOGY | Facility: HOSPITAL | Age: 39
End: 2024-11-29
Payer: COMMERCIAL

## 2024-11-30 NOTE — TELEPHONE ENCOUNTER
Patient called after hours. She had a hysterectomy over 2 months ago and was shoveling snow today and had some cramping and saw a little pink blood. Not heavy. She will not do any more shoveling and will call Monday to follow up in the office this week.

## 2024-12-02 ENCOUNTER — TELEPHONE (OUTPATIENT)
Dept: OBSTETRICS AND GYNECOLOGY | Facility: CLINIC | Age: 39
End: 2024-12-02
Payer: COMMERCIAL

## 2024-12-03 ENCOUNTER — OFFICE VISIT (OUTPATIENT)
Dept: OBSTETRICS AND GYNECOLOGY | Facility: CLINIC | Age: 39
End: 2024-12-03
Payer: COMMERCIAL

## 2024-12-03 VITALS
SYSTOLIC BLOOD PRESSURE: 140 MMHG | WEIGHT: 287 LBS | DIASTOLIC BLOOD PRESSURE: 82 MMHG | BODY MASS INDEX: 41.09 KG/M2 | HEIGHT: 70 IN

## 2024-12-03 DIAGNOSIS — N99.820 POSTOPERATIVE VAGINAL BLEEDING FOLLOWING GENITOURINARY PROCEDURE: Primary | ICD-10-CM

## 2024-12-03 PROCEDURE — 1036F TOBACCO NON-USER: CPT | Performed by: OBSTETRICS & GYNECOLOGY

## 2024-12-03 PROCEDURE — 3008F BODY MASS INDEX DOCD: CPT | Performed by: OBSTETRICS & GYNECOLOGY

## 2024-12-03 PROCEDURE — 99024 POSTOP FOLLOW-UP VISIT: CPT | Performed by: OBSTETRICS & GYNECOLOGY

## 2024-12-03 RX ORDER — LEVOTHYROXINE SODIUM 125 UG/1
1 TABLET ORAL
COMMUNITY
Start: 2024-11-22

## 2024-12-03 ASSESSMENT — ENCOUNTER SYMPTOMS
CARDIOVASCULAR NEGATIVE: 1
RESPIRATORY NEGATIVE: 1
MUSCULOSKELETAL NEGATIVE: 1
PSYCHIATRIC NEGATIVE: 1
NEUROLOGICAL NEGATIVE: 1
CONSTITUTIONAL NEGATIVE: 1
GASTROINTESTINAL NEGATIVE: 1

## 2024-12-03 NOTE — PATIENT INSTRUCTIONS
Follow Up from Hysterectomy- vaginal bleeding  You were seen in office today for increased vaginal bleeding following your hysterectomy.   Skin incisions are well healed and there is no evidence of your cuff opening up  You may continue light activities  You may continue driving, if comfortable, at this time  Return to pelvic rest: no intercourse (including sexual toys) for the next week  Call the office if you are having any heavy bleeding, foul smelling vaginal discharge, increased or new pain. [phone] ([place]) or [phone] ([place])

## 2024-12-03 NOTE — PROGRESS NOTES
Subjective   Patient ID: Geeta Ramos is a 39 y.o. female who presents for Vaginal Bleeding.  Vaginal Bleeding        Patient presents for post op bleeding. She is 10 weeks post TLH. Her postoperative course has been uncomplicated to this point. She states on 11/29 she was shoveling snow and noted a sharp pain and felt warmth in her pants. When she came in and went to the bathroom she noted pink/red blood with wiping. She notes general pelvic aching and pink blood with wiping only but otherwise generally does not note blood on her pad/underwear.     Review of Systems   Constitutional: Negative.    Respiratory: Negative.     Cardiovascular: Negative.    Gastrointestinal: Negative.    Genitourinary:  Positive for vaginal bleeding.   Musculoskeletal: Negative.    Neurological: Negative.    Psychiatric/Behavioral: Negative.         Objective   Vitals:    12/03/24 1340   BP: 140/82       Physical Exam  Constitutional:       Appearance: Normal appearance.   Pulmonary:      Effort: Pulmonary effort is normal.   Genitourinary:     Comments: Vulva normal in appearance without discoloration, rashes, lesions. Vagina is negative for blood, discharge, lesions. Uterus and cervix surgically absent. Vaginal cuff in tact. Suture material visualized, no longer in tact. Small area of granulation tissue at central cuff, cauterized with Silver Nitrate. No adnexal masses/tenderness.   Neurological:      Mental Status: She is alert.   Psychiatric:         Mood and Affect: Mood normal.         Behavior: Behavior normal.         Assessment/Plan   Problem List Items Addressed This Visit    None  Visit Diagnoses         Codes    Postoperative vaginal bleeding following genitourinary procedure    -  Primary N99.820          Discussed findings on exam  No concern for vaginal cuff dehiscence. Bleeding likely due from granulation tissue and irritation from broken suture material  Advised pelvic rest following Silver Nitrate cauterization of  granulation tissue  Plan for follow up in 1 week  Precautions given         aMrilyn Reese,  12/03/24 1:58 PM

## 2024-12-09 ENCOUNTER — APPOINTMENT (OUTPATIENT)
Dept: OBSTETRICS AND GYNECOLOGY | Facility: CLINIC | Age: 39
End: 2024-12-09
Payer: COMMERCIAL

## 2024-12-11 ENCOUNTER — OFFICE VISIT (OUTPATIENT)
Dept: OBSTETRICS AND GYNECOLOGY | Facility: CLINIC | Age: 39
End: 2024-12-11
Payer: COMMERCIAL

## 2024-12-11 ENCOUNTER — TELEPHONE (OUTPATIENT)
Dept: OBSTETRICS AND GYNECOLOGY | Facility: CLINIC | Age: 39
End: 2024-12-11

## 2024-12-11 VITALS — DIASTOLIC BLOOD PRESSURE: 64 MMHG | WEIGHT: 287 LBS | BODY MASS INDEX: 41.18 KG/M2 | SYSTOLIC BLOOD PRESSURE: 118 MMHG

## 2024-12-11 DIAGNOSIS — N99.820 POSTOPERATIVE VAGINAL BLEEDING FOLLOWING GENITOURINARY PROCEDURE: Primary | ICD-10-CM

## 2024-12-11 PROCEDURE — 99213 OFFICE O/P EST LOW 20 MIN: CPT | Performed by: OBSTETRICS & GYNECOLOGY

## 2024-12-11 ASSESSMENT — ENCOUNTER SYMPTOMS
NEUROLOGICAL NEGATIVE: 1
RESPIRATORY NEGATIVE: 1
GASTROINTESTINAL NEGATIVE: 1
MUSCULOSKELETAL NEGATIVE: 1
CARDIOVASCULAR NEGATIVE: 1
CONSTITUTIONAL NEGATIVE: 1

## 2024-12-11 NOTE — PROGRESS NOTES
Subjective   Patient ID: Geeta Ramos is a 39 y.o. female who presents for ABNORMAL BLEEDING.  HPI  Patient presents for follow up of post op vaginal bleeding. She was seen a week ago and noted to have suture material dissolving but cuff in tact. A  small amount of granulation tissue was noted and cauterized with Silver Nitrate. She state her bleeding stopped and then restarted yesterday after pushing a plastic table across the floor. She notes cramping/discomfort.     Review of Systems   Constitutional: Negative.    Respiratory: Negative.     Cardiovascular: Negative.    Gastrointestinal: Negative.    Genitourinary:  Positive for vaginal bleeding.   Musculoskeletal: Negative.    Neurological: Negative.        Objective   Visit Vitals  /64   Wt 130 kg (287 lb)   LMP 03/01/2023   BMI 41.18 kg/m²   OB Status Hysterectomy   Smoking Status Former   BSA 2.53 m²       Physical Exam  Constitutional:       Appearance: Normal appearance.   Pulmonary:      Effort: Pulmonary effort is normal.   Genitourinary:     Comments: Vaginal cuff well healed without defect on SSE/SVE. Small amount of granulation tissue noted and found to be bleeding. Cauterized with silver Nitrate.   Neurological:      Mental Status: She is alert.   Psychiatric:         Mood and Affect: Mood normal.         Behavior: Behavior normal.         Assessment/Plan   Problem List Items Addressed This Visit    None  Visit Diagnoses         Codes    Postoperative vaginal bleeding following genitourinary procedure    -  Primary N99.820          Discussed findings on exam  Granulation tissue noted and cauterized with Silver Nitrate  Advised pelvic rest  Precautions given  RTO 1 week to reassess         Marilyn Reese,  12/11/24 4:07 PM

## 2024-12-11 NOTE — PATIENT INSTRUCTIONS
Follow Up from Hysterectomy  You were seen in office today for your 2 week postoperative visit following hysterectomy.   Skin incisions are well healed  You may return to normal activities  You may return to driving, if comfortable, at this time  Continue pelvic rest: no intercourse until next visit  Call the office if you are having any heavy bleeding, foul smelling vaginal discharge, increased or new pain. [phone] ([place]) or [phone] ([place])

## 2024-12-17 ENCOUNTER — APPOINTMENT (OUTPATIENT)
Dept: OBSTETRICS AND GYNECOLOGY | Facility: CLINIC | Age: 39
End: 2024-12-17
Payer: COMMERCIAL

## 2024-12-17 VITALS
BODY MASS INDEX: 41.66 KG/M2 | DIASTOLIC BLOOD PRESSURE: 72 MMHG | SYSTOLIC BLOOD PRESSURE: 140 MMHG | HEIGHT: 70 IN | WEIGHT: 291 LBS

## 2024-12-17 DIAGNOSIS — N99.820 POSTOPERATIVE VAGINAL BLEEDING FOLLOWING GENITOURINARY PROCEDURE: Primary | ICD-10-CM

## 2024-12-17 PROCEDURE — 99213 OFFICE O/P EST LOW 20 MIN: CPT | Performed by: OBSTETRICS & GYNECOLOGY

## 2024-12-17 PROCEDURE — 3008F BODY MASS INDEX DOCD: CPT | Performed by: OBSTETRICS & GYNECOLOGY

## 2024-12-17 ASSESSMENT — ENCOUNTER SYMPTOMS
NEUROLOGICAL NEGATIVE: 1
MUSCULOSKELETAL NEGATIVE: 1
PSYCHIATRIC NEGATIVE: 1
RESPIRATORY NEGATIVE: 1
GASTROINTESTINAL NEGATIVE: 1
CARDIOVASCULAR NEGATIVE: 1
CONSTITUTIONAL NEGATIVE: 1

## 2024-12-17 NOTE — PROGRESS NOTES
"Subjective   Patient ID: Geeta Ramos is a 39 y.o. female who presents for POST OPRATIVE VISIT.  HPI    Patient presents for follow up of vaginal cuff bleeding due to granulation tissue. She has been seen for two rounds of Silver Nitrate. She states at this time the bleeding has almost stopped. She denies heavy bleeding, pelvic pain.     Review of Systems   Constitutional: Negative.    Respiratory: Negative.     Cardiovascular: Negative.    Gastrointestinal: Negative.    Genitourinary: Negative.    Musculoskeletal: Negative.    Neurological: Negative.    Psychiatric/Behavioral: Negative.         Objective   Visit Vitals  /72   Ht 1.778 m (5' 10\")   Wt 132 kg (291 lb)   LMP 03/01/2023   BMI 41.75 kg/m²   OB Status Hysterectomy   Smoking Status Former   BSA 2.55 m²       Physical Exam  Constitutional:       Appearance: Normal appearance.   Pulmonary:      Effort: Pulmonary effort is normal.   Genitourinary:     Comments: Vaginal cuff visualized with minimal granulation tissue. Cautery with Silver Nitrate.   Neurological:      Mental Status: She is alert.   Psychiatric:         Mood and Affect: Mood normal.         Behavior: Behavior normal.         Assessment/Plan   Problem List Items Addressed This Visit    None  Visit Diagnoses         Codes    Postoperative vaginal bleeding following genitourinary procedure    -  Primary N99.820          Discussed findings on exam  A final application of Silver Nitrate done today  Patient may ease back into lifting/activity  Precautions given  RTO PRN           Marilyn Reese DO 12/17/24 1:19 PM   "

## 2025-01-27 ENCOUNTER — OFFICE VISIT (OUTPATIENT)
Dept: OBSTETRICS AND GYNECOLOGY | Facility: CLINIC | Age: 40
End: 2025-01-27
Payer: COMMERCIAL

## 2025-01-27 VITALS — SYSTOLIC BLOOD PRESSURE: 124 MMHG | BODY MASS INDEX: 41.75 KG/M2 | DIASTOLIC BLOOD PRESSURE: 72 MMHG | WEIGHT: 291 LBS

## 2025-01-27 DIAGNOSIS — N89.8 GRANULATION TISSUE OF VAGINAL CUFF: Primary | ICD-10-CM

## 2025-01-27 PROCEDURE — 99213 OFFICE O/P EST LOW 20 MIN: CPT | Performed by: OBSTETRICS & GYNECOLOGY

## 2025-01-27 ASSESSMENT — ENCOUNTER SYMPTOMS
PSYCHIATRIC NEGATIVE: 1
NEUROLOGICAL NEGATIVE: 1
GASTROINTESTINAL NEGATIVE: 1
RESPIRATORY NEGATIVE: 1
CONSTITUTIONAL NEGATIVE: 1
MUSCULOSKELETAL NEGATIVE: 1
CARDIOVASCULAR NEGATIVE: 1

## 2025-01-27 NOTE — PROGRESS NOTES
Subjective   Patient ID: Geeta Ramos is a 39 y.o. female who presents for Vaginal Bleeding.  Vaginal Bleeding        Patient presents for ongoing vaginal spotting following hysterectomy. She has been seen several times for Silver Nitrite chemical cautery but has continued to have spotting with moving/exercise. She would like to proceed with cuff revision. She denies discharge, itching, burning, rash, odor.     Review of Systems   Constitutional: Negative.    Respiratory: Negative.     Cardiovascular: Negative.    Gastrointestinal: Negative.    Genitourinary:  Positive for vaginal bleeding.   Musculoskeletal: Negative.    Neurological: Negative.    Psychiatric/Behavioral: Negative.         Objective   Visit Vitals  /72   Wt 132 kg (291 lb)   LMP 03/01/2023   BMI 41.75 kg/m²   OB Status Hysterectomy   Smoking Status Former   BSA 2.55 m²       Physical Exam  Constitutional:       Appearance: Normal appearance.   Pulmonary:      Effort: Pulmonary effort is normal.   Genitourinary:     Comments: Vaginal cuff primarily healed with small persistent granulation tissue at right apex.   Neurological:      Mental Status: She is alert.   Psychiatric:         Mood and Affect: Mood normal.         Behavior: Behavior normal.         Assessment/Plan   Problem List Items Addressed This Visit    None  Visit Diagnoses         Codes    Granulation tissue of vaginal cuff    -  Primary N89.8          Discussed findings on exam  Discussed ongoing options for management including continued Silver Nitrate versus cuff revision  Advised cuff revision would consist of exam under anesthesia and oversewing of the area of bleeding cuff  All questions answered. Patient desires cuff revision.   Surgical scheduling submitted         Marilyn Reese DO 01/27/25 3:43 PM

## 2025-01-28 ENCOUNTER — APPOINTMENT (OUTPATIENT)
Dept: OBSTETRICS AND GYNECOLOGY | Facility: CLINIC | Age: 40
End: 2025-01-28
Payer: COMMERCIAL

## 2025-01-29 ENCOUNTER — PREP FOR PROCEDURE (OUTPATIENT)
Dept: OBSTETRICS AND GYNECOLOGY | Facility: HOSPITAL | Age: 40
End: 2025-01-29
Payer: COMMERCIAL

## 2025-01-29 DIAGNOSIS — A58 VAGINAL CUFF GRANULOMA: Primary | ICD-10-CM

## 2025-01-31 ENCOUNTER — TELEPHONE (OUTPATIENT)
Dept: PREADMISSION TESTING | Facility: HOSPITAL | Age: 40
End: 2025-01-31
Payer: COMMERCIAL

## 2025-01-31 NOTE — TELEPHONE ENCOUNTER
Pre-procedure PAT phone assessment completed. Pre-operative and medication instructions reviewed with patient. Patient verbalizes understanding of instructions.  SURGERY PRE-OPERATIVE INSTRUCTIONS    *You will receive a phone call the day before your procedure  after 2pm, (or the Friday before your surgery if scheduled on a Monday.) Generally the hospital will be calling you with this information after that time.    *You are not to eat after midnight the night before the surgery. You may have up to 13 ounces of clear liquids up until 2 hours prior to arriving to the hospital. The exception is with medications you were instructed to take day of surgery.    *You may take tylenol for pain/discomfort as needed.     *Stop taking all aspirin products, ibuprofen (motrin/advil), naproxen (aleve/naprosyn) for one week prior to surgery.    *Stop taking all vitamins and supplements one week prior to surgery.     *You should not have alcoholic beverages for 24 hours before surgery.     *You should not smoke 24 hours prior to surgery.     *To help prevent surgical infections bathe/shower with Dial soap the evening before surgery.    *You can wear deodorant but no lotion, powder, or perfume/cologne. You should remove all make-up and nail polish at home.    *If you wear glasses, please bring a case for the glasses with you.    *You will be asked to remove dentures and contacts.     *Please leave all valuables at home.    *You should wear loose, comfortable clothing that will accommodate bandages and/or casts.    *You should notify your doctor of any change in your condition (fever, cold, rash, etc). Surgery may need to be re-scheduled until a time you are in better health.    *A responsible adult is required to accompany you to and from the hospital if you are receiving anesthesia or a sedative. Patients are not permitted to drive for 24 hours after anesthesia.     *You can use the [employer] parking if you wish.     *If you have any  further questions please call [facility] [phone].

## 2025-02-04 ENCOUNTER — ANESTHESIA EVENT (OUTPATIENT)
Dept: OPERATING ROOM | Facility: HOSPITAL | Age: 40
End: 2025-02-04
Payer: COMMERCIAL

## 2025-02-05 ENCOUNTER — ANESTHESIA (OUTPATIENT)
Dept: OPERATING ROOM | Facility: HOSPITAL | Age: 40
End: 2025-02-05
Payer: COMMERCIAL

## 2025-02-05 ENCOUNTER — HOSPITAL ENCOUNTER (OUTPATIENT)
Facility: HOSPITAL | Age: 40
Setting detail: OUTPATIENT SURGERY
Discharge: HOME | End: 2025-02-05
Attending: OBSTETRICS & GYNECOLOGY | Admitting: OBSTETRICS & GYNECOLOGY
Payer: COMMERCIAL

## 2025-02-05 VITALS
TEMPERATURE: 97.9 F | SYSTOLIC BLOOD PRESSURE: 114 MMHG | RESPIRATION RATE: 14 BRPM | BODY MASS INDEX: 41.69 KG/M2 | OXYGEN SATURATION: 97 % | HEART RATE: 80 BPM | HEIGHT: 70 IN | WEIGHT: 291.23 LBS | DIASTOLIC BLOOD PRESSURE: 80 MMHG

## 2025-02-05 DIAGNOSIS — A58 VAGINAL CUFF GRANULOMA: Primary | ICD-10-CM

## 2025-02-05 PROBLEM — N93.9 ABNORMAL UTERINE BLEEDING (AUB): Status: RESOLVED | Noted: 2024-07-12 | Resolved: 2025-02-05

## 2025-02-05 PROBLEM — N94.6 DYSMENORRHEA: Status: RESOLVED | Noted: 2024-07-12 | Resolved: 2025-02-05

## 2025-02-05 LAB
ABO GROUP (TYPE) IN BLOOD: NORMAL
ANTIBODY SCREEN: NORMAL
ERYTHROCYTE [DISTWIDTH] IN BLOOD BY AUTOMATED COUNT: 11.9 % (ref 11.5–14.5)
HCT VFR BLD AUTO: 38.6 % (ref 36–46)
HGB BLD-MCNC: 13.5 G/DL (ref 12–16)
MCH RBC QN AUTO: 30.2 PG (ref 26–34)
MCHC RBC AUTO-ENTMCNC: 35 G/DL (ref 32–36)
MCV RBC AUTO: 86 FL (ref 80–100)
NRBC BLD-RTO: 0 /100 WBCS (ref 0–0)
PLATELET # BLD AUTO: 240 X10*3/UL (ref 150–450)
RBC # BLD AUTO: 4.47 X10*6/UL (ref 4–5.2)
RH FACTOR (ANTIGEN D): NORMAL
WBC # BLD AUTO: 6.4 X10*3/UL (ref 4.4–11.3)

## 2025-02-05 PROCEDURE — 3600000008 HC OR TIME - EACH INCREMENTAL 1 MINUTE - PROCEDURE LEVEL THREE: Performed by: OBSTETRICS & GYNECOLOGY

## 2025-02-05 PROCEDURE — 2500000004 HC RX 250 GENERAL PHARMACY W/ HCPCS (ALT 636 FOR OP/ED): Performed by: ANESTHESIOLOGY

## 2025-02-05 PROCEDURE — 7100000002 HC RECOVERY ROOM TIME - EACH INCREMENTAL 1 MINUTE: Performed by: OBSTETRICS & GYNECOLOGY

## 2025-02-05 PROCEDURE — 57200 REPAIR OF VAGINA: CPT | Performed by: OBSTETRICS & GYNECOLOGY

## 2025-02-05 PROCEDURE — 7100000009 HC PHASE TWO TIME - INITIAL BASE CHARGE: Performed by: OBSTETRICS & GYNECOLOGY

## 2025-02-05 PROCEDURE — A57200 PR COLPORRHAPHY, SUTURE VAGINAL INJURY, NON-OB: Performed by: REGISTERED NURSE

## 2025-02-05 PROCEDURE — 2720000007 HC OR 272 NO HCPCS: Performed by: OBSTETRICS & GYNECOLOGY

## 2025-02-05 PROCEDURE — 7100000001 HC RECOVERY ROOM TIME - INITIAL BASE CHARGE: Performed by: OBSTETRICS & GYNECOLOGY

## 2025-02-05 PROCEDURE — 3700000001 HC GENERAL ANESTHESIA TIME - INITIAL BASE CHARGE: Performed by: OBSTETRICS & GYNECOLOGY

## 2025-02-05 PROCEDURE — A57200 PR COLPORRHAPHY, SUTURE VAGINAL INJURY, NON-OB: Performed by: STUDENT IN AN ORGANIZED HEALTH CARE EDUCATION/TRAINING PROGRAM

## 2025-02-05 PROCEDURE — 3600000003 HC OR TIME - INITIAL BASE CHARGE - PROCEDURE LEVEL THREE: Performed by: OBSTETRICS & GYNECOLOGY

## 2025-02-05 PROCEDURE — 3700000002 HC GENERAL ANESTHESIA TIME - EACH INCREMENTAL 1 MINUTE: Performed by: OBSTETRICS & GYNECOLOGY

## 2025-02-05 PROCEDURE — 86901 BLOOD TYPING SEROLOGIC RH(D): CPT | Performed by: ANESTHESIOLOGY

## 2025-02-05 PROCEDURE — 36415 COLL VENOUS BLD VENIPUNCTURE: CPT | Performed by: ANESTHESIOLOGY

## 2025-02-05 PROCEDURE — 2500000002 HC RX 250 W HCPCS SELF ADMINISTERED DRUGS (ALT 637 FOR MEDICARE OP, ALT 636 FOR OP/ED): Performed by: STUDENT IN AN ORGANIZED HEALTH CARE EDUCATION/TRAINING PROGRAM

## 2025-02-05 PROCEDURE — 85027 COMPLETE CBC AUTOMATED: CPT | Performed by: ANESTHESIOLOGY

## 2025-02-05 PROCEDURE — 7100000010 HC PHASE TWO TIME - EACH INCREMENTAL 1 MINUTE: Performed by: OBSTETRICS & GYNECOLOGY

## 2025-02-05 PROCEDURE — 2500000004 HC RX 250 GENERAL PHARMACY W/ HCPCS (ALT 636 FOR OP/ED): Performed by: REGISTERED NURSE

## 2025-02-05 RX ORDER — FENTANYL CITRATE 50 UG/ML
INJECTION, SOLUTION INTRAMUSCULAR; INTRAVENOUS AS NEEDED
Status: DISCONTINUED | OUTPATIENT
Start: 2025-02-05 | End: 2025-02-05

## 2025-02-05 RX ORDER — MIDAZOLAM HYDROCHLORIDE 1 MG/ML
INJECTION INTRAMUSCULAR; INTRAVENOUS AS NEEDED
Status: DISCONTINUED | OUTPATIENT
Start: 2025-02-05 | End: 2025-02-05

## 2025-02-05 RX ORDER — SODIUM CHLORIDE, SODIUM LACTATE, POTASSIUM CHLORIDE, CALCIUM CHLORIDE 600; 310; 30; 20 MG/100ML; MG/100ML; MG/100ML; MG/100ML
20 INJECTION, SOLUTION INTRAVENOUS CONTINUOUS
Status: DISCONTINUED | OUTPATIENT
Start: 2025-02-05 | End: 2025-02-05 | Stop reason: HOSPADM

## 2025-02-05 RX ORDER — APREPITANT 40 MG/1
40 CAPSULE ORAL DAILY
Status: DISCONTINUED | OUTPATIENT
Start: 2025-02-05 | End: 2025-02-05 | Stop reason: HOSPADM

## 2025-02-05 RX ORDER — DEXMEDETOMIDINE IN 0.9 % NACL 20 MCG/5ML
SYRINGE (ML) INTRAVENOUS AS NEEDED
Status: DISCONTINUED | OUTPATIENT
Start: 2025-02-05 | End: 2025-02-05

## 2025-02-05 RX ORDER — DROPERIDOL 2.5 MG/ML
0.62 INJECTION, SOLUTION INTRAMUSCULAR; INTRAVENOUS ONCE AS NEEDED
Status: DISCONTINUED | OUTPATIENT
Start: 2025-02-05 | End: 2025-02-05 | Stop reason: HOSPADM

## 2025-02-05 RX ORDER — DIPHENHYDRAMINE HYDROCHLORIDE 50 MG/ML
12.5 INJECTION INTRAMUSCULAR; INTRAVENOUS ONCE AS NEEDED
Status: DISCONTINUED | OUTPATIENT
Start: 2025-02-05 | End: 2025-02-05 | Stop reason: HOSPADM

## 2025-02-05 RX ORDER — SODIUM CHLORIDE, SODIUM LACTATE, POTASSIUM CHLORIDE, CALCIUM CHLORIDE 600; 310; 30; 20 MG/100ML; MG/100ML; MG/100ML; MG/100ML
100 INJECTION, SOLUTION INTRAVENOUS CONTINUOUS
Status: DISCONTINUED | OUTPATIENT
Start: 2025-02-05 | End: 2025-02-05 | Stop reason: HOSPADM

## 2025-02-05 RX ORDER — ALBUTEROL SULFATE 0.83 MG/ML
2.5 SOLUTION RESPIRATORY (INHALATION) ONCE AS NEEDED
Status: DISCONTINUED | OUTPATIENT
Start: 2025-02-05 | End: 2025-02-05 | Stop reason: HOSPADM

## 2025-02-05 RX ORDER — PROPOFOL 10 MG/ML
INJECTION, EMULSION INTRAVENOUS AS NEEDED
Status: DISCONTINUED | OUTPATIENT
Start: 2025-02-05 | End: 2025-02-05

## 2025-02-05 RX ORDER — ONDANSETRON HYDROCHLORIDE 2 MG/ML
4 INJECTION, SOLUTION INTRAVENOUS ONCE AS NEEDED
Status: DISCONTINUED | OUTPATIENT
Start: 2025-02-05 | End: 2025-02-05 | Stop reason: HOSPADM

## 2025-02-05 RX ORDER — OXYCODONE HYDROCHLORIDE 5 MG/1
5 TABLET ORAL EVERY 4 HOURS PRN
Status: DISCONTINUED | OUTPATIENT
Start: 2025-02-05 | End: 2025-02-05 | Stop reason: HOSPADM

## 2025-02-05 RX ORDER — ONDANSETRON HYDROCHLORIDE 2 MG/ML
INJECTION, SOLUTION INTRAVENOUS AS NEEDED
Status: DISCONTINUED | OUTPATIENT
Start: 2025-02-05 | End: 2025-02-05

## 2025-02-05 RX ORDER — IBUPROFEN 600 MG/1
600 TABLET ORAL EVERY 6 HOURS PRN
COMMUNITY
Start: 2025-02-05 | End: 2025-02-15

## 2025-02-05 RX ORDER — LIDOCAINE HCL/PF 100 MG/5ML
SYRINGE (ML) INTRAVENOUS AS NEEDED
Status: DISCONTINUED | OUTPATIENT
Start: 2025-02-05 | End: 2025-02-05

## 2025-02-05 RX ORDER — MEPERIDINE HYDROCHLORIDE 25 MG/ML
12.5 INJECTION INTRAMUSCULAR; INTRAVENOUS; SUBCUTANEOUS EVERY 10 MIN PRN
Status: DISCONTINUED | OUTPATIENT
Start: 2025-02-05 | End: 2025-02-05 | Stop reason: HOSPADM

## 2025-02-05 RX ORDER — ACETAMINOPHEN 325 MG/1
650 TABLET ORAL EVERY 6 HOURS PRN
COMMUNITY
Start: 2025-02-05 | End: 2025-02-15

## 2025-02-05 RX ORDER — KETOROLAC TROMETHAMINE 30 MG/ML
INJECTION, SOLUTION INTRAMUSCULAR; INTRAVENOUS AS NEEDED
Status: DISCONTINUED | OUTPATIENT
Start: 2025-02-05 | End: 2025-02-05

## 2025-02-05 RX ADMIN — Medication 12 MCG: at 12:10

## 2025-02-05 RX ADMIN — MIDAZOLAM HYDROCHLORIDE 2 MG: 1 INJECTION, SOLUTION INTRAMUSCULAR; INTRAVENOUS at 12:05

## 2025-02-05 RX ADMIN — Medication 8 MCG: at 12:14

## 2025-02-05 RX ADMIN — MIDAZOLAM HYDROCHLORIDE 2 MG: 1 INJECTION, SOLUTION INTRAMUSCULAR; INTRAVENOUS at 12:07

## 2025-02-05 RX ADMIN — PROPOFOL 200 MG: 10 INJECTION, EMULSION INTRAVENOUS at 12:10

## 2025-02-05 RX ADMIN — APREPITANT 40 MG: 40 CAPSULE ORAL at 12:00

## 2025-02-05 RX ADMIN — KETOROLAC TROMETHAMINE 15 MG: 30 INJECTION, SOLUTION INTRAMUSCULAR; INTRAVENOUS at 12:32

## 2025-02-05 RX ADMIN — LIDOCAINE HYDROCHLORIDE 100 MG: 20 INJECTION INTRAVENOUS at 12:10

## 2025-02-05 RX ADMIN — SODIUM CHLORIDE, SODIUM LACTATE, POTASSIUM CHLORIDE, AND CALCIUM CHLORIDE 20 ML/HR: 600; 310; 30; 20 INJECTION, SOLUTION INTRAVENOUS at 10:28

## 2025-02-05 RX ADMIN — HYDROMORPHONE HYDROCHLORIDE 0.5 MG: 1 INJECTION, SOLUTION INTRAMUSCULAR; INTRAVENOUS; SUBCUTANEOUS at 13:20

## 2025-02-05 RX ADMIN — ONDANSETRON 4 MG: 2 INJECTION, SOLUTION INTRAMUSCULAR; INTRAVENOUS at 12:13

## 2025-02-05 RX ADMIN — FENTANYL CITRATE 50 MCG: 50 INJECTION, SOLUTION INTRAMUSCULAR; INTRAVENOUS at 12:14

## 2025-02-05 RX ADMIN — DEXAMETHASONE SODIUM PHOSPHATE 4 MG: 4 INJECTION INTRA-ARTICULAR; INTRALESIONAL; INTRAMUSCULAR; INTRAVENOUS; SOFT TISSUE at 12:13

## 2025-02-05 RX ADMIN — FENTANYL CITRATE 50 MCG: 50 INJECTION, SOLUTION INTRAMUSCULAR; INTRAVENOUS at 12:10

## 2025-02-05 ASSESSMENT — COLUMBIA-SUICIDE SEVERITY RATING SCALE - C-SSRS
6. HAVE YOU EVER DONE ANYTHING, STARTED TO DO ANYTHING, OR PREPARED TO DO ANYTHING TO END YOUR LIFE?: NO
2. HAVE YOU ACTUALLY HAD ANY THOUGHTS OF KILLING YOURSELF?: NO
1. IN THE PAST MONTH, HAVE YOU WISHED YOU WERE DEAD OR WISHED YOU COULD GO TO SLEEP AND NOT WAKE UP?: NO

## 2025-02-05 ASSESSMENT — PAIN SCALES - GENERAL
PAINLEVEL_OUTOF10: 8
PAINLEVEL_OUTOF10: 3
PAINLEVEL_OUTOF10: 8

## 2025-02-05 ASSESSMENT — PAIN - FUNCTIONAL ASSESSMENT: PAIN_FUNCTIONAL_ASSESSMENT: 0-10

## 2025-02-05 NOTE — H&P
Pre- Surgical History and Physical    HPI:  Patient presents for surgical visit. She is scheduled for exam under anesthesia with revision of vaginal cuff. Following her hysterectomy 4 months ago she has had persistent light bleeding. She has been examined and granulation tissue cauterized with Silver Nitrite several times without relief. There has been no recent change in her health. She denies recent pelvic pain, discharge, itching, burning, rash, dysuria, odor. She is sexually active and denies dyspareunia.        Obstetrical History   OB History    Para Term  AB Living   6 4 2 2 2 4   SAB IAB Ectopic Multiple Live Births   2 0 0 0 4      # Outcome Date GA Lbr Ward/2nd Weight Sex Type Anes PTL Lv   6 SAB            5 SAB            4             3             2 Term            1 Term                Past Medical History  Past Medical History:   Diagnosis Date    Abnormal uterine bleeding (AUB)     Anesthesia complication     awareness    Class 3 severe obesity with body mass index (BMI) of 40.0 to 44.9 in adult 2024    40.75 kg/m²    Encounter for full-term uncomplicated delivery      (spontaneous vaginal delivery)    Endometriosis 2014    Gastro-esophageal reflux disease without esophagitis     Gastroesophageal reflux disease, esophagitis presence not specified    Hirsutism 2024    History of pregnancy induced hypertension 2018    History of spontaneous      History of thyroid nodule     Hypothyroidism, unspecified     Kidney stones     Mass of axillary tail of breast 2024    Menorrhagia 10/03/2024    Mild to moderate pre-eclampsia, complicating childbirth ([facility])     Peripheral neuropathy     PONV (postoperative nausea and vomiting)     Postpartum depression     Splenomegaly     Vaginal cuff granuloma     Wears contact lenses     Wears glasses         Past Surgical History   Past Surgical History:   Procedure Laterality Date     CHOLECYSTECTOMY  07/19/2016    Cholecystectomy    COLONOSCOPY  07/19/2016    Colonoscopy (Fiberoptic)    COLPOSCOPY      DILATION AND CURETTAGE OF UTERUS  07/19/2016    Dilation And Curettage    ENDOMETRIAL ABLATION      LAPAROSCOPIC HYSTERECTOMY      with bilateral salpingectomy    LAPAROSCOPY DIAGNOSTIC / BIOPSY / ASPIRATION / LYSIS  07/19/2016    Exploratory Laparoscopy    SINUS SURGERY  07/19/2016    Sinus Surgery X2       Social History  Social History     Tobacco Use    Smoking status: Never    Smokeless tobacco: Never   Substance Use Topics    Alcohol use: Yes     Comment: rare     Substance and Sexual Activity   Drug Use Never       Allergies  Codeine and Bee venom protein (honey bee)     Medications  Medications Prior to Admission   Medication Sig Dispense Refill Last Dose/Taking    levothyroxine (Synthroid, Levoxyl) 125 mcg tablet Take 1 tablet (125 mcg) by mouth early in the morning..            Objective    Last Vitals  Temp Pulse Resp BP MAP O2 Sat                   Physical Examination  GENERAL: Examination reveals a well developed, well nourished, gravid female in no acute distress. She is alert and cooperative.  LUNGS: clear to auscultation bilaterally  HEART: regular rate and rhythm, S1, S2 normal, no murmur, click, rub or gallop  ABDOMEN: soft, NT/ND  VAGINA: deferred until OR  EXTREMITIES: no redness or tenderness in the calves or thighs, no edema  SKIN: normal coloration and turgor, no rashes  NEUROLOGICAL: alert, oriented, normal speech, no focal findings or movement disorder noted  PSYCHOLOGICAL: awake and alert; oriented to person, place, and time    Lab Review  Labs in chart were reviewed.    A/P:  Admit through [facility]  Insert IV, IVF, NPO  Antibiotic prophylaxis not indicated  General anesthesia  SCD to be placed in pre-op    Marilyn Reese DO

## 2025-02-05 NOTE — DISCHARGE INSTRUCTIONS
[facility]  [address], [place] [address]  [address], [place] [address]  Tel: [phone] ([place]) or [phone] ([place])    Home Going Instructions after Vaginal Cuff Repair:    Activity:   We do not recommend any exercise on the day of your procedure.  You may return to work the following day.  You may have light bleeding or spotting for several days after procedure.   Use only sanitary pads for bleeding, do not use tampons until you have no further bleeding.   Please abstain from intercourse until you have been seen for follow up.     Anesthesia:  Drink small amounts of liquids initially and then slowly increase your intake of food. Drinking fluids will keep your bowels regular.   Avoid foods that are sweet, spicy or hard to digest today.  If you feel nauseated, rest your stomach for one hour, and then try drinking clear liquids again.  You may take a stool softener or miralax/milk of magnesia to help with constipation that may occur after anesthesia.  Please make sure a responsible adult is with you for at least 24 hours after surgery and do not drive or make important decisions during this time. Anesthesia may affect your judgment, coordination, and reaction time.    Pain:  If you experience any post-op pain, pain can be managed by taking Ibuprofen and/or Tylenol.    Follow up:  Follow up in our office in 2 weeks     When to call your provider:  Vaginal bleeding that is more than a normal period that doesn't taper down.  Bleeding through 1 sanitary pad an hour.  Any clots the size of an egg or bigger.  Fever of 100.4 or higher with chills.  Unusual or foul smelling discharge.  Worsening abdominal pain.    Marilyn Reese, DO

## 2025-02-05 NOTE — DISCHARGE SUMMARY
Discharge Summary    Admission Date: 2/5/2025  Discharge Date: 2/5/25    Discharge Diagnosis  Vaginal cuff granuloma    Hospital Course  Patient presented and underwent exam under anesthesia with vaginal cuff revision. There were no postoperative complications.    Procedures:  exam under anesthesia, vaginal cuff revision      Pertinent Physical Exam At Time of Discharge    General: Examination reveals a well developed, well nourished, female, in no acute distress. She is alert and cooperative.  Lungs: clear to auscultation bilaterally.  Abdomen: soft, NT, ND.  Psychological: awake and alert; oriented to person, place, and time.    Last Vitals:  Temp Pulse Resp BP MAP Pulse Ox   36.8 °C (98.2 °F) 81 16 132/77   100 %     Discharge Meds     Your medication list        START taking these medications        Instructions Last Dose Given Next Dose Due   acetaminophen 325 mg tablet  Commonly known as: Tylenol      Take 2 tablets (650 mg) by mouth every 6 hours if needed for mild pain (1 - 3) for up to 20 doses.       ibuprofen 600 mg tablet      Take 1 tablet (600 mg) by mouth every 6 hours if needed for moderate pain (4 - 6) for up to 20 doses.              CONTINUE taking these medications        Instructions Last Dose Given Next Dose Due   levothyroxine 125 mcg tablet  Commonly known as: Synthroid, Levoxyl                     Where to Get Your Medications        You can get these medications from any pharmacy    You don't need a prescription for these medications  acetaminophen 325 mg tablet  ibuprofen 600 mg tablet          Complications Requiring Follow-Up  None    Test Results Pending At Discharge  Pending Labs       No current pending labs.            Outpatient Follow-Up  Future Appointments   Date Time Provider Department Center   2/20/2025 10:30 AM Marilyn Reese DO [id number]       I spent 30 minutes in the professional and overall care of this patient.      Marilyn Reese DO

## 2025-02-05 NOTE — ANESTHESIA PROCEDURE NOTES
Airway  Date/Time: 2/5/2025 12:12 PM  Urgency: elective    Airway not difficult    Staffing  Performed: CRNA   Authorized by: Saleem Figueroa MD    Performed by: CINTIA Saldaña-NIKHIL  Patient location during procedure: OR    Indications and Patient Condition  Indications for airway management: anesthesia  Spontaneous Ventilation: absent  Sedation level: deep  Preoxygenated: yes  Patient position: sniffing  Mask difficulty assessment: 1 - vent by mask  Planned trial extubation    Final Airway Details  Final airway type: supraglottic airway      Successful airway: Supraglottic airway: IGEL.  Size 5     Number of attempts at approach: 1    Additional Comments  Atraumatic

## 2025-02-05 NOTE — ANESTHESIA POSTPROCEDURE EVALUATION
Patient: Geeta Ramos    Procedure Summary       Date: 02/05/25 Room / Location: [facility] OR 07 / [facility] OR    Anesthesia Start: 1205 Anesthesia Stop: 1251    Procedure: CLOSURE, WOUND VAGINAL CUFF REPAIR Diagnosis:       Vaginal cuff granuloma      (Vaginal cuff granuloma [A58])    Surgeons: Marilyn Reese DO Responsible Provider: Saleem Figueroa MD    Anesthesia Type: general ASA Status: 3            Anesthesia Type: general    Vitals Value Taken Time   /80 02/05/25 1330   Temp 36.6 °C (97.9 °F) 02/05/25 1247   Pulse 80 02/05/25 1330   Resp 14 02/05/25 1330   SpO2 97 % 02/05/25 1247       Anesthesia Post Evaluation    Patient location during evaluation: PACU  Patient participation: complete - patient participated  Level of consciousness: awake and alert  Pain management: adequate  Airway patency: patent  Cardiovascular status: acceptable and stable  Respiratory status: acceptable and room air  Hydration status: acceptable  Postoperative Nausea and Vomiting: none        There were no known notable events for this encounter.

## 2025-02-05 NOTE — ANESTHESIA PREPROCEDURE EVALUATION
Patient: Geeta Ramos    Procedure Information       Anesthesia Start Date/Time: 02/05/25 1205    Procedure: CLOSURE, WOUND    Location: [facility] OR 07 / [facility] OR    Surgeons: Marilyn Reese, DO            Relevant Problems   Anesthesia   (+) Awareness under anesthesia   (+) PONV (postoperative nausea and vomiting)      Neuro   (+) Duane's syndrome of left eye      Endocrine   (+) Hypothyroidism       Clinical information reviewed:   Tobacco  Allergies  Meds  Problems  Med Hx  Surg Hx   Fam Hx  Soc   Hx        NPO Detail:  NPO/Void Status  Date of Last Liquid: 02/05/25  Time of Last Liquid: 0000  Date of Last Solid: 02/05/25  Time of Last Solid: 0000         Physical Exam    Airway  Mallampati: III  TM distance: >3 FB  Neck ROM: full     Cardiovascular - normal exam     Dental - normal exam     Pulmonary - normal exam     Abdominal   (+) obese             Anesthesia Plan    History of general anesthesia?: yes  History of complications of general anesthesia?: no    ASA 3     general     intravenous induction   Postoperative administration of opioids is intended.  Trial extubation is planned.  Anesthetic plan and risks discussed with patient.  Use of blood products discussed with patient who consented to blood products.    Plan discussed with CRNA.

## 2025-02-05 NOTE — OP NOTE
Date: 2025  OR Location: [facility] OR    Name: Geeta Ramos, : 1985, Age: 39 y.o., MRN: [id number], Sex: female    Diagnosis  Pre-op Diagnosis      * Vaginal cuff granuloma [A58] Post-op Diagnosis     * Vaginal cuff granuloma [A58]     Procedures  CLOSURE, WOUND VAGINAL CUFF REPAIR  69067 - NE COLPORRHAPHY SUTURE INJURY VAGINA      Surgeons      * Marilyn Reese - Primary    Resident/Fellow/Other Assistant:  Surgeons and Role:  * No surgeons found with a matching role *    Staff:   Circulator: Soraya  Scrub Person: Shelia  Circulator: Jaimie  Scrub Person: Mabel    Anesthesia Staff: Anesthesiologist: Saleem Figueroa MD  CRNA: CINTIA Saldaña-NIKHIL    Procedure Summary  Anesthesia: General  ASA: III  Estimated Blood Loss: 1 mL  Intra-op Medications:   Administrations occurring from 1100 to 1215 on 25:   Medication Name Total Dose   aprepitant (Emend) capsule 40 mg 40 mg   dexAMETHasone (Decadron) injection 4 mg/mL 4 mg   dexMEDETOMidine 4 mcg/mL in NS syringe 20 mcg   fentaNYL (Sublimaze) injection 50 mcg/mL 100 mcg   lidocaine (cardiac) injection 2% prefilled syringe 100 mg   midazolam PF (Versed) injection 1 mg/mL 4 mg   ondansetron (Zofran) 2 mg/mL injection 4 mg   propofol (Diprivan) injection 10 mg/mL 200 mg              Anesthesia Record               Intraprocedure I/O Totals          Intake    lactated Ringer's infusion 450.00 mL    Total Intake 450 mL       Output    Est. Blood Loss 1 mL    Total Output 1 mL       Net    Net Volume 449 mL          Specimen: No specimens collected              Procedure Details:  The patient was seen in the preoperative area. The site of surgery was properly noted/marked if necessary per policy. The patient has been actively warmed in preoperative area. Preoperative antibiotics are not indicated. Venous thrombosis prophylaxis have been ordered including bilateral sequential compression devices    Findings: Surgically absent uterus and cervix.  Predominantly well healed cuff. Small area of granulation tissue at left apex.      Patient presented and was taken to the OR where anesthesia was induced without issue. She was positioned dorsal lithotomy with feet in Yellow Brian stirrups and prepped and draped in the usual sterile fashion. A speculum and neda retractor were placed in the vagina and the vaginal cuff was identified and grasped at the left apex with an Allis clamp. A small amount of granulation tissue was noted and ablated using the Bovie. The area of ablation was then over sewn using 0 Vicryl in 2 interrupted sutures. The Allis clamp was then walked the full length of the vaginal cuff to the right apex and no further granulation tissue noted. The speculum and retractor were removed from the vagina and hemostasis noted. The patient was taken out of dorsal lithotomy and anesthesia reversed without issue. The patient was moved back to her PACU bed and taken to the PACU in good condition. All counts were correct.      Complications:  None; patient tolerated the procedure well.     Disposition: PACU - hemodynamically stable.  Condition: stable  Marilyn Reese DO

## 2025-02-12 ENCOUNTER — TELEPHONE (OUTPATIENT)
Dept: OBSTETRICS AND GYNECOLOGY | Facility: CLINIC | Age: 40
End: 2025-02-12

## 2025-02-12 ENCOUNTER — APPOINTMENT (OUTPATIENT)
Dept: RADIOLOGY | Facility: HOSPITAL | Age: 40
End: 2025-02-12
Payer: COMMERCIAL

## 2025-02-12 ENCOUNTER — HOSPITAL ENCOUNTER (EMERGENCY)
Facility: HOSPITAL | Age: 40
Discharge: HOME | End: 2025-02-12
Payer: COMMERCIAL

## 2025-02-12 VITALS
RESPIRATION RATE: 16 BRPM | OXYGEN SATURATION: 99 % | TEMPERATURE: 98.6 F | HEIGHT: 70 IN | DIASTOLIC BLOOD PRESSURE: 88 MMHG | BODY MASS INDEX: 40.09 KG/M2 | SYSTOLIC BLOOD PRESSURE: 139 MMHG | HEART RATE: 87 BPM | WEIGHT: 280 LBS

## 2025-02-12 DIAGNOSIS — R10.2 PELVIC PAIN IN FEMALE: ICD-10-CM

## 2025-02-12 DIAGNOSIS — N93.9 VAGINAL BLEEDING: Primary | ICD-10-CM

## 2025-02-12 LAB
ABO GROUP (TYPE) IN BLOOD: NORMAL
ALBUMIN SERPL BCP-MCNC: 4.3 G/DL (ref 3.4–5)
ALP SERPL-CCNC: 50 U/L (ref 33–110)
ALT SERPL W P-5'-P-CCNC: 20 U/L (ref 7–45)
ANION GAP SERPL CALC-SCNC: 13 MMOL/L (ref 10–20)
ANTIBODY SCREEN: NORMAL
APPEARANCE UR: CLEAR
AST SERPL W P-5'-P-CCNC: 15 U/L (ref 9–39)
BASOPHILS # BLD AUTO: 0.06 X10*3/UL (ref 0–0.1)
BASOPHILS NFR BLD AUTO: 0.9 %
BILIRUB SERPL-MCNC: 0.9 MG/DL (ref 0–1.2)
BILIRUB UR STRIP.AUTO-MCNC: NEGATIVE MG/DL
BUN SERPL-MCNC: 10 MG/DL (ref 6–23)
CALCIUM SERPL-MCNC: 9.6 MG/DL (ref 8.6–10.3)
CHLORIDE SERPL-SCNC: 104 MMOL/L (ref 98–107)
CO2 SERPL-SCNC: 24 MMOL/L (ref 21–32)
COLOR UR: NORMAL
CREAT SERPL-MCNC: 0.78 MG/DL (ref 0.5–1.05)
EGFRCR SERPLBLD CKD-EPI 2021: >90 ML/MIN/1.73M*2
EOSINOPHIL # BLD AUTO: 0.14 X10*3/UL (ref 0–0.7)
EOSINOPHIL NFR BLD AUTO: 2 %
ERYTHROCYTE [DISTWIDTH] IN BLOOD BY AUTOMATED COUNT: 11.9 % (ref 11.5–14.5)
GLUCOSE SERPL-MCNC: 114 MG/DL (ref 74–99)
GLUCOSE UR STRIP.AUTO-MCNC: NORMAL MG/DL
HCT VFR BLD AUTO: 40.6 % (ref 36–46)
HGB BLD-MCNC: 14.2 G/DL (ref 12–16)
HOLD SPECIMEN: NORMAL
IMM GRANULOCYTES # BLD AUTO: 0.05 X10*3/UL (ref 0–0.7)
IMM GRANULOCYTES NFR BLD AUTO: 0.7 % (ref 0–0.9)
INR PPP: 1.1 (ref 0.9–1.1)
KETONES UR STRIP.AUTO-MCNC: NEGATIVE MG/DL
LEUKOCYTE ESTERASE UR QL STRIP.AUTO: NEGATIVE
LYMPHOCYTES # BLD AUTO: 1.89 X10*3/UL (ref 1.2–4.8)
LYMPHOCYTES NFR BLD AUTO: 27.3 %
MCH RBC QN AUTO: 30 PG (ref 26–34)
MCHC RBC AUTO-ENTMCNC: 35 G/DL (ref 32–36)
MCV RBC AUTO: 86 FL (ref 80–100)
MONOCYTES # BLD AUTO: 0.7 X10*3/UL (ref 0.1–1)
MONOCYTES NFR BLD AUTO: 10.1 %
NEUTROPHILS # BLD AUTO: 4.08 X10*3/UL (ref 1.2–7.7)
NEUTROPHILS NFR BLD AUTO: 59 %
NITRITE UR QL STRIP.AUTO: NEGATIVE
NRBC BLD-RTO: 0 /100 WBCS (ref 0–0)
PH UR STRIP.AUTO: 7 [PH]
PLATELET # BLD AUTO: 246 X10*3/UL (ref 150–450)
POTASSIUM SERPL-SCNC: 3.7 MMOL/L (ref 3.5–5.3)
PROT SERPL-MCNC: 7.2 G/DL (ref 6.4–8.2)
PROT UR STRIP.AUTO-MCNC: NEGATIVE MG/DL
PROTHROMBIN TIME: 12.1 SECONDS (ref 9.8–12.8)
RBC # BLD AUTO: 4.74 X10*6/UL (ref 4–5.2)
RBC # UR STRIP.AUTO: NEGATIVE MG/DL
RH FACTOR (ANTIGEN D): NORMAL
SODIUM SERPL-SCNC: 137 MMOL/L (ref 136–145)
SP GR UR STRIP.AUTO: 1.01
UROBILINOGEN UR STRIP.AUTO-MCNC: NORMAL MG/DL
WBC # BLD AUTO: 6.9 X10*3/UL (ref 4.4–11.3)

## 2025-02-12 PROCEDURE — 86850 RBC ANTIBODY SCREEN: CPT | Performed by: PHYSICIAN ASSISTANT

## 2025-02-12 PROCEDURE — 2550000001 HC RX 255 CONTRASTS: Performed by: PHYSICIAN ASSISTANT

## 2025-02-12 PROCEDURE — 80053 COMPREHEN METABOLIC PANEL: CPT | Performed by: PHYSICIAN ASSISTANT

## 2025-02-12 PROCEDURE — 74177 CT ABD & PELVIS W/CONTRAST: CPT

## 2025-02-12 PROCEDURE — 99221 1ST HOSP IP/OBS SF/LOW 40: CPT | Performed by: OBSTETRICS & GYNECOLOGY

## 2025-02-12 PROCEDURE — 85610 PROTHROMBIN TIME: CPT | Performed by: PHYSICIAN ASSISTANT

## 2025-02-12 PROCEDURE — 36415 COLL VENOUS BLD VENIPUNCTURE: CPT | Performed by: PHYSICIAN ASSISTANT

## 2025-02-12 PROCEDURE — 81003 URINALYSIS AUTO W/O SCOPE: CPT | Performed by: PHYSICIAN ASSISTANT

## 2025-02-12 PROCEDURE — 85025 COMPLETE CBC W/AUTO DIFF WBC: CPT | Performed by: PHYSICIAN ASSISTANT

## 2025-02-12 PROCEDURE — 74177 CT ABD & PELVIS W/CONTRAST: CPT | Mod: FOREIGN READ | Performed by: RADIOLOGY

## 2025-02-12 PROCEDURE — 87205 SMEAR GRAM STAIN: CPT | Mod: GEALAB | Performed by: PHYSICIAN ASSISTANT

## 2025-02-12 PROCEDURE — 99285 EMERGENCY DEPT VISIT HI MDM: CPT | Mod: 25

## 2025-02-12 RX ADMIN — IOHEXOL 75 ML: 350 INJECTION, SOLUTION INTRAVENOUS at 12:34

## 2025-02-12 ASSESSMENT — COLUMBIA-SUICIDE SEVERITY RATING SCALE - C-SSRS
2. HAVE YOU ACTUALLY HAD ANY THOUGHTS OF KILLING YOURSELF?: NO
6. HAVE YOU EVER DONE ANYTHING, STARTED TO DO ANYTHING, OR PREPARED TO DO ANYTHING TO END YOUR LIFE?: NO
1. IN THE PAST MONTH, HAVE YOU WISHED YOU WERE DEAD OR WISHED YOU COULD GO TO SLEEP AND NOT WAKE UP?: NO

## 2025-02-12 ASSESSMENT — PAIN DESCRIPTION - ORIENTATION: ORIENTATION: RIGHT;LOWER

## 2025-02-12 ASSESSMENT — PAIN DESCRIPTION - DESCRIPTORS: DESCRIPTORS: ACHING;SHARP

## 2025-02-12 ASSESSMENT — LIFESTYLE VARIABLES
TOTAL SCORE: 0
EVER FELT BAD OR GUILTY ABOUT YOUR DRINKING: NO
EVER HAD A DRINK FIRST THING IN THE MORNING TO STEADY YOUR NERVES TO GET RID OF A HANGOVER: NO
HAVE PEOPLE ANNOYED YOU BY CRITICIZING YOUR DRINKING: NO
HAVE YOU EVER FELT YOU SHOULD CUT DOWN ON YOUR DRINKING: NO

## 2025-02-12 ASSESSMENT — PAIN DESCRIPTION - FREQUENCY: FREQUENCY: CONSTANT/CONTINUOUS

## 2025-02-12 ASSESSMENT — PAIN SCALES - GENERAL: PAINLEVEL_OUTOF10: 5 - MODERATE PAIN

## 2025-02-12 ASSESSMENT — PAIN - FUNCTIONAL ASSESSMENT: PAIN_FUNCTIONAL_ASSESSMENT: 0-10

## 2025-02-12 ASSESSMENT — PAIN DESCRIPTION - LOCATION: LOCATION: ABDOMEN

## 2025-02-12 ASSESSMENT — PAIN DESCRIPTION - PAIN TYPE: TYPE: ACUTE PAIN

## 2025-02-12 NOTE — ED TRIAGE NOTES
Patient states on 9/11/24 she had a hysterectomy, she has been having vaginal bleeding ever since then, in November she was shoveling snow and rupture her sutures which caused increased bleeding, she had a revision done a week ago, yesterday she was picking up some costumes for her kids and began to bleed again. Patient had an OB appointment this afternoon in [place] but it was too far of a drive for her so she was advised to come to the ED.

## 2025-02-12 NOTE — CONSULTS
"GYN Consult    Reason for Consult: vaginal bleeding and pelvic pain s/p hysterectomy    Assessment/Plan    Vagina bleeding and pelvic pain s/p hysterectomy and vaginal cuff revision  Vaginal bleeding is minimal as she is hemodynamically stable.   Bleeding was persistent since TLH in 9/2024; however, she is only one week postop from a vaginal cuff revision and spotting would be expected during this time frame as a part of the normal healing process. Normal vaginal cuff on exam. I expect that there will be some bleeding associated with the healing process. She likely had a scab formation after surgery and bleeding associated with its release.  Vaginal cuff intact. No active bleeding on exam.  No evidence of cuff breakdown or dehiscence  Pelvic pain may be related to known endometriosis or MSK pelvic pain.   CT pending to r/o other etiology of pelvic pain or postsurgical complication. Please call if CT is abnormal.   BV/yeast swab collected as vaginal infection could impair wound healing. No evidence of infection at vaginal cuff.  Consider glucose screening though this can be done outpatient.     Subjective   Pt presented to ED for vaginal bleeding and pelvic pain.  VB is spotting with wiping.  Pelvic pain is right sided and feels like a stabbing, 5/10. This started last night initially as a crampy feeling.  Having normal Bms. No UTI sx. No abnormal discharge. No concern for STIs.  No fevers, chills.     She is POD#7 s/p vaginal cuff revision and s/p TLH/BS on 9/11/2024 and pt reports significant endometriosis.  After the surgery, she had persistent spotting but no pain.  She then underwent vaginal cuff revision for an area of granulation tissue. She has spotting x1 day immediately postop then nothing until now when she has recurrence of spotting and new onset pain as above.      Operative findings include: \"Moderately enlarged uterus that is otherwise grossly normal. Grossly normal bilateral tubes/ovaries. Significant " "scarring of the pelvis. Endometriosis.\"  Operative pathology 2024 revealed  A. Uterus, cervix, and bilateral fallopian tubes, laparoscopic hysterectomy and bilateral salpingectomy:  - Secretory pattern endometrium.  - Myometrium with adenomyosis and leiomyoma.  - Cervix with no significant histopathologic findings.  - Left fallopian tube with serosal endometriosis.  - Right fallopian tube with paratubal cyst.      Obstetrical History   OB History    Para Term  AB Living   6 4 2 2 2 4   SAB IAB Ectopic Multiple Live Births   2 0 0 0 4      # Outcome Date GA Lbr Ward/2nd Weight Sex Type Anes PTL Lv   6 SAB            5 SAB            4             3             2 Term            1 Term                Past Medical History  Past Medical History:   Diagnosis Date    Abnormal uterine bleeding (AUB)     Anesthesia complication     awareness    Class 3 severe obesity with body mass index (BMI) of 40.0 to 44.9 in adult 2024    40.75 kg/m²    Encounter for full-term uncomplicated delivery      (spontaneous vaginal delivery)    Endometriosis 2014    Gastro-esophageal reflux disease without esophagitis     Gastroesophageal reflux disease, esophagitis presence not specified    Hirsutism 2024    History of pregnancy induced hypertension 2018    History of spontaneous      History of thyroid nodule     Hypothyroidism, unspecified     Kidney stones     Mass of axillary tail of breast 2024    Menorrhagia 10/03/2024    Mild to moderate pre-eclampsia, complicating childbirth ([facility])     Peripheral neuropathy     PONV (postoperative nausea and vomiting)     Postpartum depression     Splenomegaly     Vaginal cuff granuloma     Wears contact lenses     Wears glasses         Past Surgical History   Past Surgical History:   Procedure Laterality Date    CHOLECYSTECTOMY  2016    Cholecystectomy    COLONOSCOPY  2016    Colonoscopy (Fiberoptic)    COLPOSCOPY "      DILATION AND CURETTAGE OF UTERUS  07/19/2016    Dilation And Curettage    ENDOMETRIAL ABLATION      LAPAROSCOPIC HYSTERECTOMY      with bilateral salpingectomy    LAPAROSCOPY DIAGNOSTIC / BIOPSY / ASPIRATION / LYSIS  07/19/2016    Exploratory Laparoscopy    SINUS SURGERY  07/19/2016    Sinus Surgery X2       Social History  Social History     Tobacco Use    Smoking status: Never    Smokeless tobacco: Never   Substance Use Topics    Alcohol use: Yes     Comment: rare     Substance and Sexual Activity   Drug Use Never       Allergies  Codeine and Bee venom protein (honey bee)     Medications  (Not in a hospital admission)      Objective    Last Vitals  Temp Pulse Resp BP MAP O2 Sat   37 °C (98.6 °F) 90 18 (!) 189/93   99 %     Physical Examination  Physical Exam  Constitutional:       General: She is not in acute distress.     Appearance: She is obese. She is not ill-appearing or diaphoretic.      Comments: She is tearful   HENT:      Head: Normocephalic and atraumatic.   Genitourinary:     General: Normal vulva.      Vagina: No vaginal discharge.      Comments: Vaginal cuff intact with pink, healthy tissue appreciated visually. Dark blood mixed with thin discharge.  No active bleeding from vaginal cuff. No pus. Some TTP at area of surgical cuff revision on bimanual exam and on the right pelvic sidewall. No rebound or guarding. Nonmalodorous. Bladder NTTP. Rectum was TTP on the first palpation but not the second. Left NTTP. No masses appreciated though limited exam due to body habitus.   Skin:     General: Skin is warm and dry.   Neurological:      General: No focal deficit present.      Mental Status: She is alert.   Psychiatric:      Comments: appears anxious, frustrated          Lab Review  Lab Results   Component Value Date    WBC 6.9 02/12/2025    HGB 14.2 02/12/2025    HCT 40.6 02/12/2025     02/12/2025

## 2025-02-12 NOTE — ED PROVIDER NOTES
"HPI   Chief Complaint   Patient presents with    Vaginal Bleeding       Is a 39-year-old female coming for vaginal bleeding.  Patient reports that she has had vaginal bleeding since .  She has had multiple visits due to tearing of sutures as well as nonhealing post surgical vaginal wound.  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  Patient reports that she is not soaking through pads or through close but she reports every time she goes to the restroom she notices blood when she wipes.  Patient is not on a blood thinner.  She called the OBs office who stated that they could get her in however she did not want a wait and did not want to travel as far as to where they can get their appointment so she came to the emergency room instead.  Patient does complain of discomfort to the pelvic area.  She denies any lightheadedness or dizziness.  Patient reports that she \"just want this to be fixed\".      History provided by:  Patient          Patient History   Past Medical History:   Diagnosis Date    Abnormal uterine bleeding (AUB)     Anesthesia complication     awareness    Class 3 severe obesity with body mass index (BMI) of 40.0 to 44.9 in adult 2024    40.75 kg/m²    Encounter for full-term uncomplicated delivery      (spontaneous vaginal delivery)    Endometriosis 2014    Gastro-esophageal reflux disease without esophagitis     Gastroesophageal reflux disease, esophagitis presence not specified    Hirsutism 2024    History of pregnancy induced hypertension 2018    History of spontaneous      History of thyroid nodule     Hypothyroidism, unspecified     Kidney stones     Mass of axillary tail of breast 2024    Menorrhagia 10/03/2024    Mild to moderate pre-eclampsia, complicating childbirth ([facility])     Peripheral neuropathy     PONV (postoperative nausea and vomiting)     Postpartum depression     Splenomegaly     " Vaginal cuff granuloma     Wears contact lenses     Wears glasses      Past Surgical History:   Procedure Laterality Date    CHOLECYSTECTOMY  07/19/2016    Cholecystectomy    COLONOSCOPY  07/19/2016    Colonoscopy (Fiberoptic)    COLPOSCOPY      DILATION AND CURETTAGE OF UTERUS  07/19/2016    Dilation And Curettage    ENDOMETRIAL ABLATION      LAPAROSCOPIC HYSTERECTOMY      with bilateral salpingectomy    LAPAROSCOPY DIAGNOSTIC / BIOPSY / ASPIRATION / LYSIS  07/19/2016    Exploratory Laparoscopy    SINUS SURGERY  07/19/2016    Sinus Surgery X2     Family History   Problem Relation Name Age of Onset    Hypertension Mother Jessie and Kudlock     Hyperlipidemia Mother Jessie and Kudlock     Stroke Mother Jessie and Kudlock     Depression Mother Jessie and Kudlock     Anxiety disorder Mother Jessie and Kudlock     Hypertension Father Kudlock     Hyperlipidemia Father Kudlock     Heart disease Father Kudlock     Bipolar disorder Father Kudlock     Breast cancer Maternal Grandmother Jessie     Hypertension Maternal Grandmother Jessie     Hyperlipidemia Maternal Grandmother Jessie     Pancreatic cancer Maternal Grandfather Kudlock     Lung cancer Maternal Grandfather Kudlock     Diabetes type II Maternal Grandfather Kudlock     Hypertension Maternal Grandfather Kudlock     Hyperlipidemia Maternal Grandfather Kudlock     Hypertension Paternal Grandmother Kudlock     Hyperlipidemia Paternal Grandmother Kudlock     Colon cancer Paternal Grandfather Kudlock     Hypertension Paternal Grandfather Kudlock     Hyperlipidemia Paternal Grandfather Kudlock      Social History     Tobacco Use    Smoking status: Never    Smokeless tobacco: Never   Vaping Use    Vaping status: Never Used   Substance Use Topics    Alcohol use: Yes     Comment: rare    Drug use: Never       Physical Exam   ED Triage Vitals [02/12/25 1120]   Temperature Heart Rate Respirations BP   37 °C (98.6 °F) 90 18 (!) 189/93      Pulse Ox Temp Source Heart  Rate Source Patient Position   99 % Temporal Monitor --      BP Location FiO2 (%)     -- --       Physical Exam  Vitals and nursing note reviewed.   Constitutional:       General: She is not in acute distress.     Appearance: Normal appearance. She is not toxic-appearing.   HENT:      Head: Normocephalic and atraumatic.      Nose: Nose normal.      Mouth/Throat:      Mouth: Mucous membranes are moist.      Pharynx: Oropharynx is clear.   Eyes:      Extraocular Movements: Extraocular movements intact.      Conjunctiva/sclera: Conjunctivae normal.      Pupils: Pupils are equal, round, and reactive to light.   Cardiovascular:      Rate and Rhythm: Normal rate and regular rhythm.      Pulses: Normal pulses.      Heart sounds: Normal heart sounds.   Pulmonary:      Effort: Pulmonary effort is normal. No respiratory distress.      Breath sounds: Normal breath sounds.   Abdominal:      General: Abdomen is flat. Bowel sounds are normal.      Palpations: Abdomen is soft.      Tenderness: There is no abdominal tenderness.   Genitourinary:     Comments: Exam deferred as OB/GYN will come and see the patient.  Musculoskeletal:         General: Normal range of motion.      Cervical back: Normal range of motion and neck supple.   Skin:     General: Skin is warm and dry.      Coloration: Skin is not jaundiced or pale.      Findings: No bruising.      Comments: No pallor.   Neurological:      General: No focal deficit present.      Mental Status: She is alert and oriented to person, place, and time. Mental status is at baseline.   Psychiatric:         Mood and Affect: Mood normal.         Behavior: Behavior normal.           ED Course & MDM   Diagnoses as of 02/12/25 1529   Vaginal bleeding   Pelvic pain in female                 No data recorded     Analilia Coma Scale Score: 15 (02/12/25 1121 : Ty Wlesh RN)                           Medical Decision Making  Summary:  Medical Decision Making:   Patient presented as described in  HPI. Patient case including ROS, PE, and treatment and plan discussed with ED attending if attached as cosigner. Results from labs and or imaging included below if completed. Geeta Ramos  is a 39 y.o. coming in for Patient presents with:  Vaginal Bleeding  .  Patient reports no massive hemorrhage.  Lab work was completed on the patient.  She is stable H&H.  Contacted OB/GYN who will come and see the patient at bedside.  Dr. Feliz came down and saw the patient.  Did recommend getting a CT abdomen pelvis.  CT abdomen pelvis showed no concerning abnormalities.  OB/GYN did obtain a BV swab so this was added in.  Due to no concerning abnormalities on the CT abdomen pelvis patient will be discharged advised follow-up with her OB/GYN outpatient.  Patient agrees with treatment plan and states that she will comply.      Disposition is completed with shared decision making with the patient or guardian present with the patient. They were advised to follow up with PCP or recommended provider in 2-3 days for another evaluation and exam. I advised the patient to return or go to closest emergency room immediately if symptoms change, get worse, or new symptoms develop prior to follow up. I explained the plan and treatment course. Patient/guardian is in agreement with plan, treatment course, and follow up and state that they will comply.    Labs Reviewed  COMPREHENSIVE METABOLIC PANEL - Abnormal     Glucose                       114 (*)                Sodium                        137                    Potassium                     3.7                    Chloride                      104                    Bicarbonate                   24                     Anion Gap                     13                     Urea Nitrogen                 10                     Creatinine                    0.78                   eGFR                          >90                    Calcium                       9.6                    Albumin                        4.3                    Alkaline Phosphatase          50                     Total Protein                 7.2                    AST                           15                     Bilirubin, Total              0.9                    ALT                           20                  URINALYSIS WITH REFLEX CULTURE AND MICROSCOPIC - Normal     Color, Urine                                         Appearance, Urine             Clear                  Specific Gravity, Urine       1.006                  pH, Urine                     7.0                    Protein, Urine                NEGATIVE                Glucose, Urine                Normal                 Blood, Urine                  NEGATIVE                Ketones, Urine                NEGATIVE                Bilirubin, Urine              NEGATIVE                Urobilinogen, Urine           Normal                 Nitrite, Urine                NEGATIVE                Leukocyte Esterase, Urine     NEGATIVE             PROTIME-INR - Normal     Protime                       12.1                   INR                           1.1                 GRAM STAIN FOR BACTERIAL VAGINOSIS/YEAST  CBC WITH AUTO DIFFERENTIAL     WBC                           6.9                    nRBC                          0.0                    RBC                           4.74                   Hemoglobin                    14.2                   Hematocrit                    40.6                   MCV                           86                     MCH                           30.0                   MCHC                          35.0                   RDW                           11.9                   Platelets                     246                    Neutrophils %                 59.0                   Immature Granulocytes %, Automated   0.7                    Lymphocytes %                 27.3                   Monocytes %                   10.1                    Eosinophils %                 2.0                    Basophils %                   0.9                    Neutrophils Absolute          4.08                   Immature Granulocytes Absolute, Au*   0.05                   Lymphocytes Absolute          1.89                   Monocytes Absolute            0.70                   Eosinophils Absolute          0.14                   Basophils Absolute            0.06                TYPE AND SCREEN     ABO TYPE                      A                      Rh TYPE                       NEG                    ANTIBODY SCREEN               NEG                      Narrative: Review your Rh Negative female patient's potential need for Rh Immune Globulin (RhIg)administration.  URINALYSIS WITH REFLEX CULTURE AND MICROSCOPIC       Narrative: The following orders were created for panel order Urinalysis with Reflex Culture and Microscopic.                Procedure                               Abnormality         Status                                   ---------                               -----------         ------                                   Urinalysis with Reflex C...[[id number]]  Normal              Final result                             Extra Urine Gray Tube[[id number]]                            In process                                               Please view results for these tests on the individual orders.  EXTRA URINE GRAY TUBE   CT abdomen pelvis w IV contrast   Final Result    No definite acute intra-abdominal pathology identified.  Status post    hysterectomy without definite evidence of complication.    Signed by Yuri Fuchs                            Tests/Medications/Escalations of Care considered but not given: As in MDM    Patient care discussed with: N/A  Social Determinants affecting care: N/A    Final diagnosis and disposition as documented     Diagnoses as of 02/12/25 1530  Vaginal bleeding  Pelvic pain in female       Shared decision making  was completed and determined that patient will be discharged. I discussed the differential; results and discharge plan with the patient and/or family/friend/caregiver if present.  I emphasized the importance of follow-up with the physician I referred them to in the timeframe recommended.  I explained reasons for the patient to return to the Emergency Department. They agreed that if they feel their condition is worsening or if they have any other concern they should call 911 immediately for further assistance. I gave the patient an opportunity to ask all questions they had and answered all of them accordingly. They understand return precautions and discharge instructions. The patient and/or family/friend/caregiver expressed understanding verbally and that they would comply.     Disposition: Discharge      This note has been transcribed using voice recognition and may contain grammatical errors, misplaced words, incorrect words, incorrect phrases or other errors.         Procedure  Procedures     Jovani Moise PA-C  02/12/25 1531       Jovani Moise PA-C  02/12/25 1531

## 2025-02-14 ENCOUNTER — OFFICE VISIT (OUTPATIENT)
Dept: OBSTETRICS AND GYNECOLOGY | Facility: CLINIC | Age: 40
End: 2025-02-14
Payer: COMMERCIAL

## 2025-02-14 VITALS — SYSTOLIC BLOOD PRESSURE: 130 MMHG | BODY MASS INDEX: 41.96 KG/M2 | DIASTOLIC BLOOD PRESSURE: 70 MMHG | WEIGHT: 292.4 LBS

## 2025-02-14 DIAGNOSIS — N99.820 POSTOPERATIVE VAGINAL BLEEDING FOLLOWING GENITOURINARY PROCEDURE: Primary | ICD-10-CM

## 2025-02-14 ASSESSMENT — ENCOUNTER SYMPTOMS
RESPIRATORY NEGATIVE: 1
NEUROLOGICAL NEGATIVE: 1
CARDIOVASCULAR NEGATIVE: 1
GASTROINTESTINAL NEGATIVE: 1
PSYCHIATRIC NEGATIVE: 1
CONSTITUTIONAL NEGATIVE: 1
MUSCULOSKELETAL NEGATIVE: 1

## 2025-02-14 NOTE — PROGRESS NOTES
Subjective   Patient ID: Geeta Ramos is a 39 y.o. female who presents for PASSING BLOOD CLOTS. .  HPI    Patient presents for ongoing vaginal bleeding. She had an uncomplicated TLH in October 2024. She initially had appropriate healing then around 8 weeks post op had onset of spotting. She was seen and evaluated and found to have granulation tissue that was cauterized using Silver Nitrate over several visits. She continued to have periodic spotting and requested cuff revision. She underwent exam under anesthesia with Bovie cautery and over sewing of granulation tissue 2 weeks ago. Over the last 3 days she has had recurrence of spotting. She was seen in the ER 2 days ago and examined by Dr. Feliz. Her cuff was noted to be in tact with sutures visible and minimal blood in the vault. She had a CT that noted surgical absence of uterus but was otherwise normal. She states yesterday her bleeding again increased to the extent of dripping while using the restroom.     Review of Systems   Constitutional: Negative.    Respiratory: Negative.     Cardiovascular: Negative.    Gastrointestinal: Negative.    Genitourinary:  Positive for vaginal bleeding.   Musculoskeletal: Negative.    Neurological: Negative.    Psychiatric/Behavioral: Negative.         Objective   Visit Vitals  /70   Wt 133 kg (292 lb 6.4 oz)   LMP 03/01/2023   BMI 41.96 kg/m²   OB Status Hysterectomy   Smoking Status Never   BSA 2.56 m²      Physical Exam  Constitutional:       Appearance: Normal appearance.   Pulmonary:      Effort: Pulmonary effort is normal.   Genitourinary:     Comments: Vulva normal in appearance without discoloration, rashes, lesions. Vagina is negative discharge, lesions. Well healed vaginal cuff. Previously placed sutures visualized at left apex. One of the two sutures had come out with a small amount of old blood around the suture. Silver nitrite applied to suture site to prevent further bleeding. All old blood cleared using  rectal swab and no new bleeding noted during 2 minutes of monitoring.   Neurological:      Mental Status: She is alert.   Psychiatric:         Mood and Affect: Mood normal.         Behavior: Behavior normal.         Assessment/Plan   Problem List Items Addressed This Visit    None  Visit Diagnoses         Codes    Postoperative vaginal bleeding following genitourinary procedure    -  Primary N99.820          Discussed findings on exam  Discussed she is now healing from the new procedure and current bleeding is likely due to the suture coming out and disrupting cautery eschar  Advised there is no bleeding at this time but she may have new bleeding when the second suture comes out.   Parameters for abnormal bleeding discussed  Plan for patient to keep previously scheduled upcoming post op visit.   Precautions given         Marilyn Reese DO 02/14/25 10:56 AM

## 2025-02-20 ENCOUNTER — APPOINTMENT (OUTPATIENT)
Dept: OBSTETRICS AND GYNECOLOGY | Facility: CLINIC | Age: 40
End: 2025-02-20
Payer: COMMERCIAL

## 2025-02-20 VITALS — WEIGHT: 290 LBS | BODY MASS INDEX: 41.61 KG/M2 | SYSTOLIC BLOOD PRESSURE: 110 MMHG | DIASTOLIC BLOOD PRESSURE: 70 MMHG

## 2025-02-20 DIAGNOSIS — Z48.89 SUTURE CHECK: Primary | ICD-10-CM

## 2025-02-20 PROCEDURE — 99024 POSTOP FOLLOW-UP VISIT: CPT | Performed by: OBSTETRICS & GYNECOLOGY

## 2025-02-20 PROCEDURE — 1036F TOBACCO NON-USER: CPT | Performed by: OBSTETRICS & GYNECOLOGY

## 2025-02-20 ASSESSMENT — ENCOUNTER SYMPTOMS
CARDIOVASCULAR NEGATIVE: 1
MUSCULOSKELETAL NEGATIVE: 1
GASTROINTESTINAL NEGATIVE: 1
NEUROLOGICAL NEGATIVE: 1
CONSTITUTIONAL NEGATIVE: 1
RESPIRATORY NEGATIVE: 1
PSYCHIATRIC NEGATIVE: 1

## 2025-02-20 NOTE — PROGRESS NOTES
Subjective   Patient ID: Geeta Ramos is a 39 y.o. female who presents for No chief complaint on file..  HPI    Patient presents for post op visit. She is 2 weeks post vaginal cuff revision for granulation tissue and bleeding following TLH 6 months ago. She was seen for bleeding last week, which was determined to be due to one of her sutures falling out. She has not had further bleeding over the last week. She denies pelvic pain, discharge, itching, burning, rash, dysuria, odor.     Review of Systems   Constitutional: Negative.    Respiratory: Negative.     Cardiovascular: Negative.    Gastrointestinal: Negative.    Genitourinary: Negative.    Musculoskeletal: Negative.    Neurological: Negative.    Psychiatric/Behavioral: Negative.         Objective   Visit Vitals  /70   Wt 132 kg (290 lb)   LMP 03/01/2023   BMI 41.61 kg/m²   OB Status Hysterectomy   Smoking Status Never   BSA 2.55 m²       Physical Exam  Constitutional:       Appearance: Normal appearance.   Pulmonary:      Effort: Pulmonary effort is normal.   Genitourinary:     Comments: Vulva normal in appearance without discoloration, rashes, lesions. Vagina is negative for blood, discharge, lesions. Vaginal cuff revision is well healed with no visible granulation tissue, bleeding. Single suture still present.   Neurological:      Mental Status: She is alert.   Psychiatric:         Mood and Affect: Mood normal.         Behavior: Behavior normal.         Assessment/Plan   Problem List Items Addressed This Visit    None  Visit Diagnoses         Codes    Suture check    -  Primary Z48.89          Discussed findings on exam and clinical course  Revision is now well healed  Patient may return to normal activity and does not need to be on pelvic rest at this time  Precautions given  RTO 1 year IRINA Reese DO 02/20/25 11:11 AM

## 2025-02-20 NOTE — PATIENT INSTRUCTIONS
Follow Up from Cuff Revision- 2 weeks:  You were seen in office today for your 2 week postoperative visit following hysterectomy.   Vaginal surgical site is well healed  You may return to normal activities  You may return to driving, if comfortable, at this time  You do not need to be on pelvic rest  Call the office if you are having any heavy bleeding, foul smelling vaginal discharge, increased or new pain. [phone] ([place]) or [phone] ([place])

## (undated) DEVICE — IRRIGATION SET, CYSTOSCOPY, TURP, Y, CONTINUOUS, 81 IN

## (undated) DEVICE — COUNTER, NEEDLE, FOAM STRIP, DOUBLE, W/BLADEGUARD, 30 COUNT

## (undated) DEVICE — LIGASURE, V SEALER/DIVIDER  5MM BLUNT TIP

## (undated) DEVICE — CAUTERY, PENCIL, PUSH BUTTON, SMOKE EVAC, 70MM

## (undated) DEVICE — Device

## (undated) DEVICE — POSITIONING, THE PINK PAD, PIGAZZI SYSTEM

## (undated) DEVICE — DRAPE, UNDERBUTTOCKS

## (undated) DEVICE — APPLICATOR, CHLORAPREP, W/ORANGE TINT, 26ML

## (undated) DEVICE — CLIP, ENDO, CLINCH II, W/RATCHET, ON/OFF, CLINCH II, 5 MM

## (undated) DEVICE — MANIPULATOR, ADVINCULA DELINEATOR, 3.5CM

## (undated) DEVICE — SOLUTION, INJECTION, USP, SODIUM CHLORIDE 0.9%, .9, NACL, 1000 ML, BAG

## (undated) DEVICE — DRESSING, NON-ADHERENT, TELFA, OUCHLESS, 3 X 8 IN, STERILE

## (undated) DEVICE — SYSTEM, FIOS FIRST ENTRY, 5 X 100MM, KII ADVANCED FIXATION

## (undated) DEVICE — TRAY, FOLEY, ADVANCE, 16FR, SILICONE, W/STATLOCK

## (undated) DEVICE — PROTECTORS, ONE STEP, ARM LARGE, W/DERMAPROX

## (undated) DEVICE — NEEDLE, INSUFFLATION, 13GAX120MM, DISP

## (undated) DEVICE — TROCAR SYSTEM, BALLOON, KII GELPORT, 12 X 100MM

## (undated) DEVICE — PREP TRAY, SKIN, DRY, W/GLOVES

## (undated) DEVICE — NEEDLE, HYPODERMIC, REGULAR WALL, REGULAR BEVEL, 25 G X 1.5 IN

## (undated) DEVICE — SYRINGE, 10 CC, LUER LOCK

## (undated) DEVICE — SUTURE, VICRYL, 0, 27 IN, CT-1, UNDYED

## (undated) DEVICE — ADHESIVE, SKIN, DERMABOND ADVANCED, 15CM, PEN-STYLE

## (undated) DEVICE — TUBE SET, PNEUMOLAR HEATED, SMOKE EVACU, HIGH-FLOW

## (undated) DEVICE — DRAPE PACK, LAVH, W/ATTACHED LEGGINGS, W/POUCH, 100 X 114 IN, LF, STERILE

## (undated) DEVICE — KIT, MINOR, DOUBLE BASIN

## (undated) DEVICE — DEVICE, FORCE TRIVERSE ELECTROSURGICAL

## (undated) DEVICE — SUTURE, VICRYL, 0, 27 IN, UR-6, VIOLET

## (undated) DEVICE — ELECTRODE, ELECTROSURGICAL, LAPAROSCOPIC, L HOOK TIP, 36 IN

## (undated) DEVICE — SUTURE, RELOAD, ENDOSTITCH, V-LOC 180, 0 W/LOOPS

## (undated) DEVICE — CARE KIT, LAPAROSCOPIC, ADVANCED

## (undated) DEVICE — NEEDLE, SAFETY, 18 G X 1.5 IN

## (undated) DEVICE — SYRINGE, HYPODERMIC, LUER LOCK, 6 CC

## (undated) DEVICE — SUTURE, VICRYL, 4-0, 18 IN, PS2, UNDYED

## (undated) DEVICE — SCISSOR, MINI ENDO CUT, TIPS, DISP

## (undated) DEVICE — COVER HANDLE LIGHT, STERIS, BLUE, STERILE

## (undated) DEVICE — PAD, SANITARY, OBSTETRICAL, W/ADHESIVE STRIP, WING, 11 IN, NS

## (undated) DEVICE — DEVICE, SUTURE, ENDOSTITCH, 10 MM

## (undated) DEVICE — ACCESS SYS, KII SHIELDED BLADED, Z-THREAD, 12X100CM

## (undated) DEVICE — TOWEL PACK, STERILE, 4/PACK, BLUE

## (undated) DEVICE — COVER, TABLE, 44X90

## (undated) DEVICE — DRAPE, INSTRUMENT, W/POUCH, STERI DRAPE, 9 5/8 X 18 LONG

## (undated) DEVICE — TRAY, FOLEY, URINE METER, SURESTEP, 16FR, W/STATLOCK

## (undated) DEVICE — ASSEMBLY, STRYKER FLOW 2, SUCTION IRRIGATOR, WITH TIP

## (undated) DEVICE — DRAPE PACK, LAPAROSCOPIC CHOLECYSTECTOMY, CUSTOM, GEAUGA

## (undated) DEVICE — SYRINGE, 60 CC, LUER LOCK, MONOJECT, W/O CAP, LF